# Patient Record
Sex: FEMALE | Race: ASIAN | Employment: OTHER | URBAN - NONMETROPOLITAN AREA
[De-identification: names, ages, dates, MRNs, and addresses within clinical notes are randomized per-mention and may not be internally consistent; named-entity substitution may affect disease eponyms.]

---

## 2017-01-01 ENCOUNTER — LAB ENCOUNTER (OUTPATIENT)
Dept: LAB | Age: 82
End: 2017-01-01
Attending: FAMILY MEDICINE
Payer: MEDICARE

## 2017-01-01 ENCOUNTER — TELEPHONE (OUTPATIENT)
Dept: FAMILY MEDICINE CLINIC | Facility: CLINIC | Age: 82
End: 2017-01-01

## 2017-01-01 ENCOUNTER — OFFICE VISIT (OUTPATIENT)
Dept: HEMATOLOGY/ONCOLOGY | Facility: HOSPITAL | Age: 82
End: 2017-01-01
Attending: INTERNAL MEDICINE
Payer: MEDICARE

## 2017-01-01 ENCOUNTER — ANESTHESIA EVENT (OUTPATIENT)
Dept: SURGERY | Facility: HOSPITAL | Age: 82
DRG: 330 | End: 2017-01-01
Payer: MEDICARE

## 2017-01-01 ENCOUNTER — OFFICE VISIT (OUTPATIENT)
Dept: FAMILY MEDICINE CLINIC | Facility: CLINIC | Age: 82
End: 2017-01-01

## 2017-01-01 ENCOUNTER — APPOINTMENT (OUTPATIENT)
Dept: ULTRASOUND IMAGING | Facility: HOSPITAL | Age: 82
DRG: 330 | End: 2017-01-01
Attending: HOSPITALIST
Payer: MEDICARE

## 2017-01-01 ENCOUNTER — TELEPHONE (OUTPATIENT)
Dept: HEMATOLOGY/ONCOLOGY | Facility: HOSPITAL | Age: 82
End: 2017-01-01

## 2017-01-01 ENCOUNTER — ANESTHESIA (OUTPATIENT)
Dept: SURGERY | Facility: HOSPITAL | Age: 82
DRG: 330 | End: 2017-01-01
Payer: MEDICARE

## 2017-01-01 ENCOUNTER — SNF VISIT (OUTPATIENT)
Dept: INTERNAL MEDICINE CLINIC | Facility: CLINIC | Age: 82
End: 2017-01-01

## 2017-01-01 ENCOUNTER — TELEPHONE (OUTPATIENT)
Dept: SURGERY | Facility: CLINIC | Age: 82
End: 2017-01-01

## 2017-01-01 ENCOUNTER — APPOINTMENT (OUTPATIENT)
Dept: ULTRASOUND IMAGING | Facility: HOSPITAL | Age: 82
DRG: 330 | End: 2017-01-01
Attending: PHYSICIAN ASSISTANT
Payer: MEDICARE

## 2017-01-01 ENCOUNTER — APPOINTMENT (OUTPATIENT)
Dept: GENERAL RADIOLOGY | Facility: HOSPITAL | Age: 82
DRG: 682 | End: 2017-01-01
Attending: INTERNAL MEDICINE
Payer: MEDICARE

## 2017-01-01 ENCOUNTER — APPOINTMENT (OUTPATIENT)
Dept: GENERAL RADIOLOGY | Facility: HOSPITAL | Age: 82
DRG: 330 | End: 2017-01-01
Attending: HOSPITALIST
Payer: MEDICARE

## 2017-01-01 ENCOUNTER — SURGERY (OUTPATIENT)
Age: 82
End: 2017-01-01

## 2017-01-01 ENCOUNTER — PATIENT MESSAGE (OUTPATIENT)
Dept: FAMILY MEDICINE CLINIC | Facility: CLINIC | Age: 82
End: 2017-01-01

## 2017-01-01 ENCOUNTER — PATIENT OUTREACH (OUTPATIENT)
Dept: CASE MANAGEMENT | Age: 82
End: 2017-01-01

## 2017-01-01 ENCOUNTER — OFFICE VISIT (OUTPATIENT)
Dept: SURGERY | Facility: CLINIC | Age: 82
End: 2017-01-01

## 2017-01-01 ENCOUNTER — HOSPITAL ENCOUNTER (OUTPATIENT)
Dept: NUCLEAR MEDICINE | Facility: HOSPITAL | Age: 82
Discharge: HOME | End: 2017-01-01
Attending: INTERNAL MEDICINE
Payer: MEDICARE

## 2017-01-01 ENCOUNTER — MED REC SCAN ONLY (OUTPATIENT)
Dept: FAMILY MEDICINE CLINIC | Facility: CLINIC | Age: 82
End: 2017-01-01

## 2017-01-01 ENCOUNTER — TELEPHONE (OUTPATIENT)
Dept: WOUND CARE | Facility: HOSPITAL | Age: 82
End: 2017-01-01

## 2017-01-01 ENCOUNTER — HOSPITAL ENCOUNTER (INPATIENT)
Facility: HOSPITAL | Age: 82
LOS: 4 days | Discharge: HOME HEALTH | DRG: 682 | End: 2017-01-01
Attending: EMERGENCY MEDICINE | Admitting: INTERNAL MEDICINE
Payer: MEDICARE

## 2017-01-01 ENCOUNTER — APPOINTMENT (OUTPATIENT)
Dept: CT IMAGING | Facility: HOSPITAL | Age: 82
DRG: 330 | End: 2017-01-01
Attending: INTERNAL MEDICINE
Payer: MEDICARE

## 2017-01-01 ENCOUNTER — APPOINTMENT (OUTPATIENT)
Dept: GENERAL RADIOLOGY | Facility: HOSPITAL | Age: 82
DRG: 682 | End: 2017-01-01
Attending: EMERGENCY MEDICINE
Payer: MEDICARE

## 2017-01-01 ENCOUNTER — APPOINTMENT (OUTPATIENT)
Dept: LAB | Age: 82
End: 2017-01-01
Attending: FAMILY MEDICINE
Payer: MEDICARE

## 2017-01-01 ENCOUNTER — SNF ADMIT/H&P (OUTPATIENT)
Dept: INTERNAL MEDICINE CLINIC | Facility: CLINIC | Age: 82
End: 2017-01-01

## 2017-01-01 ENCOUNTER — APPOINTMENT (OUTPATIENT)
Dept: ULTRASOUND IMAGING | Facility: HOSPITAL | Age: 82
DRG: 330 | End: 2017-01-01
Attending: UROLOGY
Payer: MEDICARE

## 2017-01-01 ENCOUNTER — HOSPITAL ENCOUNTER (INPATIENT)
Facility: HOSPITAL | Age: 82
LOS: 10 days | Discharge: NURSING, REHAB OR LONG-TERM CARE | DRG: 330 | End: 2017-01-01
Attending: INTERNAL MEDICINE | Admitting: INTERNAL MEDICINE
Payer: MEDICARE

## 2017-01-01 ENCOUNTER — HOSPITAL ENCOUNTER (OUTPATIENT)
Dept: CT IMAGING | Age: 82
Discharge: HOME | End: 2017-01-01
Attending: INTERNAL MEDICINE
Payer: MEDICARE

## 2017-01-01 VITALS
SYSTOLIC BLOOD PRESSURE: 122 MMHG | OXYGEN SATURATION: 98 % | HEART RATE: 104 BPM | TEMPERATURE: 99 F | WEIGHT: 85 LBS | BODY MASS INDEX: 16 KG/M2 | DIASTOLIC BLOOD PRESSURE: 70 MMHG

## 2017-01-01 VITALS
SYSTOLIC BLOOD PRESSURE: 99 MMHG | HEART RATE: 81 BPM | HEIGHT: 60 IN | WEIGHT: 78 LBS | RESPIRATION RATE: 18 BRPM | DIASTOLIC BLOOD PRESSURE: 59 MMHG | BODY MASS INDEX: 15.31 KG/M2

## 2017-01-01 VITALS
WEIGHT: 76.13 LBS | HEIGHT: 59 IN | BODY MASS INDEX: 15.35 KG/M2 | SYSTOLIC BLOOD PRESSURE: 136 MMHG | DIASTOLIC BLOOD PRESSURE: 72 MMHG | TEMPERATURE: 98 F

## 2017-01-01 VITALS
DIASTOLIC BLOOD PRESSURE: 60 MMHG | OXYGEN SATURATION: 99 % | RESPIRATION RATE: 14 BRPM | HEART RATE: 102 BPM | TEMPERATURE: 98 F | WEIGHT: 72.38 LBS | BODY MASS INDEX: 14 KG/M2 | SYSTOLIC BLOOD PRESSURE: 104 MMHG

## 2017-01-01 VITALS
BODY MASS INDEX: 18 KG/M2 | HEART RATE: 82 BPM | RESPIRATION RATE: 14 BRPM | WEIGHT: 100 LBS | TEMPERATURE: 96 F | SYSTOLIC BLOOD PRESSURE: 126 MMHG | DIASTOLIC BLOOD PRESSURE: 79 MMHG

## 2017-01-01 VITALS
SYSTOLIC BLOOD PRESSURE: 120 MMHG | SYSTOLIC BLOOD PRESSURE: 144 MMHG | RESPIRATION RATE: 12 BRPM | DIASTOLIC BLOOD PRESSURE: 82 MMHG | BODY MASS INDEX: 15 KG/M2 | WEIGHT: 85 LBS | HEART RATE: 76 BPM | HEART RATE: 84 BPM | WEIGHT: 83 LBS | OXYGEN SATURATION: 98 % | TEMPERATURE: 98 F | BODY MASS INDEX: 16 KG/M2 | TEMPERATURE: 98 F | DIASTOLIC BLOOD PRESSURE: 60 MMHG

## 2017-01-01 VITALS
TEMPERATURE: 98 F | HEART RATE: 90 BPM | DIASTOLIC BLOOD PRESSURE: 54 MMHG | SYSTOLIC BLOOD PRESSURE: 119 MMHG | HEIGHT: 59 IN | WEIGHT: 78.19 LBS | OXYGEN SATURATION: 99 % | RESPIRATION RATE: 18 BRPM | BODY MASS INDEX: 15.76 KG/M2

## 2017-01-01 VITALS
DIASTOLIC BLOOD PRESSURE: 68 MMHG | BODY MASS INDEX: 16 KG/M2 | SYSTOLIC BLOOD PRESSURE: 120 MMHG | WEIGHT: 80 LBS | TEMPERATURE: 98 F | HEART RATE: 86 BPM | RESPIRATION RATE: 16 BRPM | OXYGEN SATURATION: 99 %

## 2017-01-01 VITALS
OXYGEN SATURATION: 100 % | RESPIRATION RATE: 18 BRPM | SYSTOLIC BLOOD PRESSURE: 107 MMHG | HEIGHT: 59.02 IN | HEART RATE: 79 BPM | DIASTOLIC BLOOD PRESSURE: 64 MMHG | WEIGHT: 82.38 LBS | BODY MASS INDEX: 16.61 KG/M2 | TEMPERATURE: 98 F

## 2017-01-01 VITALS
OXYGEN SATURATION: 100 % | RESPIRATION RATE: 20 BRPM | HEART RATE: 72 BPM | DIASTOLIC BLOOD PRESSURE: 93 MMHG | BODY MASS INDEX: 15.2 KG/M2 | TEMPERATURE: 98 F | SYSTOLIC BLOOD PRESSURE: 119 MMHG | HEIGHT: 59 IN | WEIGHT: 75.38 LBS

## 2017-01-01 VITALS
BODY MASS INDEX: 18 KG/M2 | HEART RATE: 73 BPM | TEMPERATURE: 98 F | OXYGEN SATURATION: 97 % | RESPIRATION RATE: 18 BRPM | SYSTOLIC BLOOD PRESSURE: 139 MMHG | DIASTOLIC BLOOD PRESSURE: 57 MMHG | WEIGHT: 100 LBS

## 2017-01-01 VITALS
RESPIRATION RATE: 18 BRPM | HEIGHT: 59.02 IN | DIASTOLIC BLOOD PRESSURE: 71 MMHG | SYSTOLIC BLOOD PRESSURE: 115 MMHG | BODY MASS INDEX: 14.11 KG/M2 | TEMPERATURE: 98 F | WEIGHT: 70 LBS | HEART RATE: 90 BPM | OXYGEN SATURATION: 100 %

## 2017-01-01 DIAGNOSIS — C18.2 MALIGNANT NEOPLASM OF ASCENDING COLON (HCC): ICD-10-CM

## 2017-01-01 DIAGNOSIS — N17.9 AKI (ACUTE KIDNEY INJURY) (HCC): Primary | ICD-10-CM

## 2017-01-01 DIAGNOSIS — C18.2 MALIGNANT NEOPLASM OF ASCENDING COLON (HCC): Primary | ICD-10-CM

## 2017-01-01 DIAGNOSIS — R19.7 DIARRHEA, UNSPECIFIED TYPE: ICD-10-CM

## 2017-01-01 DIAGNOSIS — R63.4 WEIGHT LOSS: ICD-10-CM

## 2017-01-01 DIAGNOSIS — D50.9 MICROCYTIC HYPOCHROMIC ANEMIA: Primary | ICD-10-CM

## 2017-01-01 DIAGNOSIS — R23.1 PALLOR: ICD-10-CM

## 2017-01-01 DIAGNOSIS — D64.9 ANEMIA, UNSPECIFIED TYPE: ICD-10-CM

## 2017-01-01 DIAGNOSIS — R53.83 FATIGUE, UNSPECIFIED TYPE: ICD-10-CM

## 2017-01-01 DIAGNOSIS — E86.0 DEHYDRATION: ICD-10-CM

## 2017-01-01 DIAGNOSIS — R18.0 MALIGNANT ASCITES: ICD-10-CM

## 2017-01-01 DIAGNOSIS — E55.9 VITAMIN D DEFICIENCY: ICD-10-CM

## 2017-01-01 DIAGNOSIS — D64.89 ANEMIA DUE TO OTHER CAUSE: ICD-10-CM

## 2017-01-01 DIAGNOSIS — R59.0 LYMPHADENOPATHY, ABDOMINAL: ICD-10-CM

## 2017-01-01 DIAGNOSIS — D50.9 MICROCYTIC HYPOCHROMIC ANEMIA: ICD-10-CM

## 2017-01-01 DIAGNOSIS — R33.9 URINE RETENTION: ICD-10-CM

## 2017-01-01 DIAGNOSIS — L89.41: ICD-10-CM

## 2017-01-01 DIAGNOSIS — E46 PROTEIN CALORIE MALNUTRITION (HCC): ICD-10-CM

## 2017-01-01 DIAGNOSIS — R13.19 ESOPHAGEAL DYSPHAGIA: ICD-10-CM

## 2017-01-01 DIAGNOSIS — K56.609 SMALL BOWEL OBSTRUCTION (HCC): ICD-10-CM

## 2017-01-01 DIAGNOSIS — R19.00 PELVIC MASS IN FEMALE: ICD-10-CM

## 2017-01-01 DIAGNOSIS — K63.89 CECUM MASS: ICD-10-CM

## 2017-01-01 DIAGNOSIS — Z93.3 COLOSTOMY STATUS (HCC): ICD-10-CM

## 2017-01-01 DIAGNOSIS — R63.4 WEIGHT LOSS: Primary | ICD-10-CM

## 2017-01-01 DIAGNOSIS — Z93.3 S/P COLOSTOMY (HCC): Primary | ICD-10-CM

## 2017-01-01 DIAGNOSIS — C18.7 MALIGNANT NEOPLASM OF SIGMOID COLON (HCC): Primary | ICD-10-CM

## 2017-01-01 DIAGNOSIS — D64.89 ANEMIA DUE TO OTHER CAUSE: Primary | ICD-10-CM

## 2017-01-01 DIAGNOSIS — E03.9 HYPOTHYROIDISM, UNSPECIFIED TYPE: ICD-10-CM

## 2017-01-01 DIAGNOSIS — R10.84 GENERALIZED ABDOMINAL PAIN: ICD-10-CM

## 2017-01-01 DIAGNOSIS — E46 MALNUTRITION (HCC): ICD-10-CM

## 2017-01-01 DIAGNOSIS — E87.6 HYPOKALEMIA: ICD-10-CM

## 2017-01-01 DIAGNOSIS — R13.10 ODYNOPHAGIA: Primary | ICD-10-CM

## 2017-01-01 DIAGNOSIS — D50.0 IRON DEFICIENCY ANEMIA DUE TO CHRONIC BLOOD LOSS: ICD-10-CM

## 2017-01-01 DIAGNOSIS — Z51.89 ENCOUNTER FOR WOUND CARE: ICD-10-CM

## 2017-01-01 DIAGNOSIS — Z43.3 COLOSTOMY CARE (HCC): ICD-10-CM

## 2017-01-01 DIAGNOSIS — Z93.2 ILEOSTOMY IN PLACE (HCC): ICD-10-CM

## 2017-01-01 DIAGNOSIS — Z71.89 COUNSELING REGARDING END OF LIFE DECISION MAKING: Primary | ICD-10-CM

## 2017-01-01 DIAGNOSIS — R74.01 TRANSAMINITIS: ICD-10-CM

## 2017-01-01 DIAGNOSIS — R06.02 SHORTNESS OF BREATH: Primary | ICD-10-CM

## 2017-01-01 DIAGNOSIS — R11.0 NAUSEA: ICD-10-CM

## 2017-01-01 DIAGNOSIS — Z93.3 COLOSTOMY STATUS (HCC): Primary | ICD-10-CM

## 2017-01-01 DIAGNOSIS — C18.7 MALIGNANT NEOPLASM OF SIGMOID COLON (HCC): ICD-10-CM

## 2017-01-01 DIAGNOSIS — C77.9 REGIONAL LYMPH NODE METASTASIS PRESENT (HCC): ICD-10-CM

## 2017-01-01 DIAGNOSIS — Z71.89 COUNSELING REGARDING END OF LIFE DECISION MAKING: ICD-10-CM

## 2017-01-01 LAB
25-HYDROXYVITAMIN D (TOTAL): 69.4 NG/ML (ref 30–100)
ALBUMIN SERPL-MCNC: 1.3 G/DL (ref 3.5–4.8)
ALBUMIN SERPL-MCNC: 1.4 G/DL (ref 3.5–4.8)
ALBUMIN SERPL-MCNC: 1.7 G/DL (ref 3.5–4.8)
ALBUMIN SERPL-MCNC: 1.9 G/DL (ref 3.5–4.8)
ALBUMIN SERPL-MCNC: 2.1 G/DL (ref 3.5–4.8)
ALBUMIN SERPL-MCNC: 2.2 G/DL (ref 3.5–4.8)
ALBUMIN SERPL-MCNC: 2.2 G/DL (ref 3.5–4.8)
ALBUMIN SERPL-MCNC: 2.4 G/DL (ref 3.5–4.8)
ALBUMIN SERPL-MCNC: 2.5 G/DL (ref 3.5–4.8)
ALBUMIN SERPL-MCNC: 2.6 G/DL (ref 3.5–4.8)
ALBUMIN SERPL-MCNC: 2.6 G/DL (ref 3.5–4.8)
ALBUMIN SERPL-MCNC: 2.7 G/DL (ref 3.5–4.8)
ALBUMIN SERPL-MCNC: 3.1 G/DL (ref 3.5–4.8)
ALBUMIN SERPL-MCNC: 3.4 G/DL (ref 3.5–4.8)
ALP LIVER SERPL-CCNC: 100 U/L (ref 55–142)
ALP LIVER SERPL-CCNC: 168 U/L (ref 55–142)
ALP LIVER SERPL-CCNC: 181 U/L (ref 55–142)
ALP LIVER SERPL-CCNC: 183 U/L (ref 55–142)
ALP LIVER SERPL-CCNC: 213 U/L (ref 55–142)
ALP LIVER SERPL-CCNC: 221 U/L (ref 55–142)
ALP LIVER SERPL-CCNC: 249 U/L (ref 55–142)
ALP LIVER SERPL-CCNC: 269 U/L (ref 55–142)
ALP LIVER SERPL-CCNC: 40 U/L (ref 55–142)
ALP LIVER SERPL-CCNC: 41 U/L (ref 55–142)
ALP LIVER SERPL-CCNC: 45 U/L (ref 55–142)
ALP LIVER SERPL-CCNC: 50 U/L (ref 55–142)
ALP LIVER SERPL-CCNC: 52 U/L (ref 55–142)
ALP LIVER SERPL-CCNC: 57 U/L (ref 55–142)
ALP LIVER SERPL-CCNC: 64 U/L (ref 55–142)
ALP LIVER SERPL-CCNC: 84 U/L (ref 55–142)
ALP LIVER SERPL-CCNC: 88 U/L (ref 55–142)
ALT SERPL-CCNC: 10 U/L (ref 14–54)
ALT SERPL-CCNC: 10 U/L (ref 14–54)
ALT SERPL-CCNC: 109 U/L (ref 14–54)
ALT SERPL-CCNC: 12 U/L (ref 14–54)
ALT SERPL-CCNC: 128 U/L (ref 14–54)
ALT SERPL-CCNC: 13 U/L (ref 14–54)
ALT SERPL-CCNC: 14 U/L (ref 14–54)
ALT SERPL-CCNC: 16 U/L (ref 14–54)
ALT SERPL-CCNC: 65 U/L (ref 14–54)
ALT SERPL-CCNC: 67 U/L (ref 14–54)
ALT SERPL-CCNC: 70 U/L (ref 14–54)
ALT SERPL-CCNC: 8 U/L (ref 14–54)
ALT SERPL-CCNC: 8 U/L (ref 14–54)
ALT SERPL-CCNC: 81 U/L (ref 14–54)
ALT SERPL-CCNC: 84 U/L (ref 14–54)
ALT SERPL-CCNC: 9 U/L (ref 14–54)
ALT SERPL-CCNC: 9 U/L (ref 14–54)
ANTIBODY SCREEN: NEGATIVE
ANTIBODY SCREEN: NEGATIVE
AST SERPL-CCNC: 10 U/L (ref 15–41)
AST SERPL-CCNC: 10 U/L (ref 15–41)
AST SERPL-CCNC: 14 U/L (ref 15–41)
AST SERPL-CCNC: 20 U/L (ref 15–41)
AST SERPL-CCNC: 22 U/L (ref 15–41)
AST SERPL-CCNC: 26 U/L (ref 15–41)
AST SERPL-CCNC: 28 U/L (ref 15–41)
AST SERPL-CCNC: 31 U/L (ref 15–41)
AST SERPL-CCNC: 32 U/L (ref 15–41)
AST SERPL-CCNC: 38 U/L (ref 15–41)
AST SERPL-CCNC: 4 U/L (ref 15–41)
AST SERPL-CCNC: 47 U/L (ref 15–41)
AST SERPL-CCNC: 5 U/L (ref 15–41)
AST SERPL-CCNC: 54 U/L (ref 15–41)
AST SERPL-CCNC: 6 U/L (ref 15–41)
AST SERPL-CCNC: 6 U/L (ref 15–41)
AST SERPL-CCNC: 7 U/L (ref 15–41)
ATRIAL RATE: 75 BPM
ATRIAL RATE: 80 BPM
BASOPHILS # BLD AUTO: 0.01 X10(3) UL (ref 0–0.1)
BASOPHILS # BLD AUTO: 0.02 X10(3) UL (ref 0–0.1)
BASOPHILS # BLD AUTO: 0.04 X10(3) UL (ref 0–0.1)
BASOPHILS # BLD AUTO: 0.05 X10(3) UL (ref 0–0.1)
BASOPHILS NFR BLD AUTO: 0.1 %
BASOPHILS NFR BLD AUTO: 0.1 %
BASOPHILS NFR BLD AUTO: 0.2 %
BASOPHILS NFR BLD AUTO: 0.3 %
BASOPHILS NFR BLD AUTO: 0.3 %
BASOPHILS NFR BLD AUTO: 0.4 %
BASOPHILS NFR BLD AUTO: 0.5 %
BASOPHILS NFR BLD AUTO: 0.6 %
BASOPHILS NFR BLD AUTO: 0.7 %
BASOPHILS NFR BLD AUTO: 0.8 %
BILIRUB SERPL-MCNC: 0.1 MG/DL (ref 0.1–2)
BILIRUB SERPL-MCNC: 0.2 MG/DL (ref 0.1–2)
BILIRUB SERPL-MCNC: 0.3 MG/DL (ref 0.1–2)
BILIRUB SERPL-MCNC: 0.4 MG/DL (ref 0.1–2)
BILIRUB UR QL STRIP.AUTO: NEGATIVE
BLOOD TYPE BARCODE: 5100
BUN BLD-MCNC: 14 MG/DL (ref 8–20)
BUN BLD-MCNC: 16 MG/DL (ref 8–20)
BUN BLD-MCNC: 19 MG/DL (ref 8–20)
BUN BLD-MCNC: 20 MG/DL (ref 8–20)
BUN BLD-MCNC: 23 MG/DL (ref 8–20)
BUN BLD-MCNC: 24 MG/DL (ref 8–20)
BUN BLD-MCNC: 29 MG/DL (ref 8–20)
BUN BLD-MCNC: 30 MG/DL (ref 8–20)
BUN BLD-MCNC: 31 MG/DL (ref 8–20)
BUN BLD-MCNC: 33 MG/DL (ref 8–20)
BUN BLD-MCNC: 36 MG/DL (ref 8–20)
BUN BLD-MCNC: 37 MG/DL (ref 8–20)
BUN BLD-MCNC: 38 MG/DL (ref 8–20)
BUN BLD-MCNC: 39 MG/DL (ref 8–20)
BUN BLD-MCNC: 40 MG/DL (ref 8–20)
BUN BLD-MCNC: 43 MG/DL (ref 8–20)
BUN BLD-MCNC: 54 MG/DL (ref 8–20)
BUN BLD-MCNC: 67 MG/DL (ref 8–20)
BUN BLD-MCNC: 69 MG/DL (ref 8–20)
CALCIUM BLD-MCNC: 10.2 MG/DL (ref 8.3–10.3)
CALCIUM BLD-MCNC: 6.3 MG/DL (ref 8.3–10.3)
CALCIUM BLD-MCNC: 6.8 MG/DL (ref 8.3–10.3)
CALCIUM BLD-MCNC: 6.8 MG/DL (ref 8.3–10.3)
CALCIUM BLD-MCNC: 7 MG/DL (ref 8.3–10.3)
CALCIUM BLD-MCNC: 7.5 MG/DL (ref 8.3–10.3)
CALCIUM BLD-MCNC: 7.6 MG/DL (ref 8.3–10.3)
CALCIUM BLD-MCNC: 7.7 MG/DL (ref 8.3–10.3)
CALCIUM BLD-MCNC: 7.8 MG/DL (ref 8.3–10.3)
CALCIUM BLD-MCNC: 7.9 MG/DL (ref 8.3–10.3)
CALCIUM BLD-MCNC: 8 MG/DL (ref 8.3–10.3)
CALCIUM BLD-MCNC: 8.4 MG/DL (ref 8.3–10.3)
CALCIUM BLD-MCNC: 8.5 MG/DL (ref 8.3–10.3)
CALCIUM BLD-MCNC: 8.6 MG/DL (ref 8.3–10.3)
CALCIUM BLD-MCNC: 8.7 MG/DL (ref 8.3–10.3)
CALCIUM BLD-MCNC: 8.8 MG/DL (ref 8.3–10.3)
CALCIUM BLD-MCNC: 8.8 MG/DL (ref 8.3–10.3)
CALCIUM BLD-MCNC: 9.7 MG/DL (ref 8.3–10.3)
CALCIUM BLD-MCNC: 9.9 MG/DL (ref 8.3–10.3)
CANCER AG 125 (CA125): 11.8 U/ML (ref ?–35)
CEA: 15.9 NG/ML (ref 0.5–5)
CEA: 5 NG/ML (ref 0.5–5)
CHLORIDE: 104 MMOL/L (ref 101–111)
CHLORIDE: 105 MMOL/L (ref 101–111)
CHLORIDE: 106 MMOL/L (ref 101–111)
CHLORIDE: 107 MMOL/L (ref 101–111)
CHLORIDE: 108 MMOL/L (ref 101–111)
CHLORIDE: 108 MMOL/L (ref 101–111)
CHLORIDE: 110 MMOL/L (ref 101–111)
CHLORIDE: 111 MMOL/L (ref 101–111)
CHLORIDE: 115 MMOL/L (ref 101–111)
CHLORIDE: 115 MMOL/L (ref 101–111)
CHLORIDE: 118 MMOL/L (ref 101–111)
CHLORIDE: 119 MMOL/L (ref 101–111)
CHLORIDE: 119 MMOL/L (ref 101–111)
CHLORIDE: 89 MMOL/L (ref 101–111)
CHLORIDE: 99 MMOL/L (ref 101–111)
CO2: 17 MMOL/L (ref 22–32)
CO2: 17 MMOL/L (ref 22–32)
CO2: 19 MMOL/L (ref 22–32)
CO2: 19 MMOL/L (ref 22–32)
CO2: 20 MMOL/L (ref 22–32)
CO2: 23 MMOL/L (ref 22–32)
CO2: 24 MMOL/L (ref 22–32)
CO2: 25 MMOL/L (ref 22–32)
CO2: 26 MMOL/L (ref 22–32)
CO2: 27 MMOL/L (ref 22–32)
CO2: 29 MMOL/L (ref 22–32)
CO2: 32 MMOL/L (ref 22–32)
CO2: 35 MMOL/L (ref 22–32)
CO2: 38 MMOL/L (ref 22–32)
COLOR UR AUTO: YELLOW
CREAT BLD-MCNC: 0.4 MG/DL (ref 0.55–1.02)
CREAT BLD-MCNC: 0.5 MG/DL (ref 0.55–1.02)
CREAT BLD-MCNC: 0.58 MG/DL (ref 0.55–1.02)
CREAT BLD-MCNC: 0.73 MG/DL (ref 0.55–1.02)
CREAT BLD-MCNC: 0.8 MG/DL (ref 0.55–1.02)
CREAT BLD-MCNC: 0.84 MG/DL (ref 0.55–1.02)
CREAT BLD-MCNC: 0.86 MG/DL (ref 0.55–1.02)
CREAT BLD-MCNC: 0.92 MG/DL (ref 0.55–1.02)
CREAT BLD-MCNC: 0.94 MG/DL (ref 0.55–1.02)
CREAT BLD-MCNC: 1 MG/DL (ref 0.55–1.02)
CREAT BLD-MCNC: 1 MG/DL (ref 0.55–1.02)
CREAT BLD-MCNC: 1.02 MG/DL (ref 0.55–1.02)
CREAT BLD-MCNC: 1.08 MG/DL (ref 0.55–1.02)
CREAT BLD-MCNC: 1.09 MG/DL (ref 0.55–1.02)
CREAT BLD-MCNC: 1.13 MG/DL (ref 0.55–1.02)
CREAT BLD-MCNC: 1.21 MG/DL (ref 0.55–1.02)
CREAT BLD-MCNC: 1.21 MG/DL (ref 0.55–1.02)
CREAT BLD-MCNC: 1.3 MG/DL (ref 0.55–1.02)
CREAT BLD-MCNC: 1.4 MG/DL (ref 0.55–1.02)
DEPRECATED HBV CORE AB SER IA-ACNC: 15.6 NG/ML (ref 10–291)
EOSINOPHIL # BLD AUTO: 0 X10(3) UL (ref 0–0.3)
EOSINOPHIL # BLD AUTO: 0.01 X10(3) UL (ref 0–0.3)
EOSINOPHIL # BLD AUTO: 0.03 X10(3) UL (ref 0–0.3)
EOSINOPHIL # BLD AUTO: 0.04 X10(3) UL (ref 0–0.3)
EOSINOPHIL # BLD AUTO: 0.05 X10(3) UL (ref 0–0.3)
EOSINOPHIL # BLD AUTO: 0.08 X10(3) UL (ref 0–0.3)
EOSINOPHIL # BLD AUTO: 0.09 X10(3) UL (ref 0–0.3)
EOSINOPHIL # BLD AUTO: 0.13 X10(3) UL (ref 0–0.3)
EOSINOPHIL # BLD AUTO: 0.14 X10(3) UL (ref 0–0.3)
EOSINOPHIL # BLD AUTO: 0.17 X10(3) UL (ref 0–0.3)
EOSINOPHIL NFR BLD AUTO: 0 %
EOSINOPHIL NFR BLD AUTO: 0.1 %
EOSINOPHIL NFR BLD AUTO: 0.5 %
EOSINOPHIL NFR BLD AUTO: 0.7 %
EOSINOPHIL NFR BLD AUTO: 0.9 %
EOSINOPHIL NFR BLD AUTO: 1.2 %
EOSINOPHIL NFR BLD AUTO: 1.2 %
EOSINOPHIL NFR BLD AUTO: 2.1 %
EOSINOPHIL NFR BLD AUTO: 2.1 %
EOSINOPHIL NFR BLD AUTO: 3.1 %
ERYTHROCYTE [DISTWIDTH] IN BLOOD BY AUTOMATED COUNT: 20.3 % (ref 11.5–16)
ERYTHROCYTE [DISTWIDTH] IN BLOOD BY AUTOMATED COUNT: 20.3 % (ref 11.5–16)
ERYTHROCYTE [DISTWIDTH] IN BLOOD BY AUTOMATED COUNT: 20.4 % (ref 11.5–16)
ERYTHROCYTE [DISTWIDTH] IN BLOOD BY AUTOMATED COUNT: 21.5 % (ref 11.5–16)
ERYTHROCYTE [DISTWIDTH] IN BLOOD BY AUTOMATED COUNT: 22.1 % (ref 11.5–16)
ERYTHROCYTE [DISTWIDTH] IN BLOOD BY AUTOMATED COUNT: 23.7 % (ref 11.5–16)
ERYTHROCYTE [DISTWIDTH] IN BLOOD BY AUTOMATED COUNT: 24 % (ref 11.5–16)
ERYTHROCYTE [DISTWIDTH] IN BLOOD BY AUTOMATED COUNT: 24.2 % (ref 11.5–16)
ERYTHROCYTE [DISTWIDTH] IN BLOOD BY AUTOMATED COUNT: 25.2 % (ref 11.5–16)
ERYTHROCYTE [DISTWIDTH] IN BLOOD BY AUTOMATED COUNT: 27.9 % (ref 11.5–16)
ERYTHROCYTE [DISTWIDTH] IN BLOOD BY AUTOMATED COUNT: 29.5 % (ref 11.5–16)
ERYTHROCYTE [DISTWIDTH] IN BLOOD BY AUTOMATED COUNT: 30.6 % (ref 11.5–16)
ERYTHROCYTE [DISTWIDTH] IN BLOOD BY AUTOMATED COUNT: 30.9 % (ref 11.5–16)
ERYTHROPOIETIN (EPO): 42 MU/ML
FOLATE (FOLIC ACID), SERUM: 77.2 NG/ML (ref 8.7–24)
FREE T4: 1.2 NG/DL (ref 0.9–1.8)
FREE T4: 1.2 NG/DL (ref 0.9–1.8)
GLUCOSE BLD-MCNC: 100 MG/DL (ref 70–99)
GLUCOSE BLD-MCNC: 102 MG/DL (ref 65–99)
GLUCOSE BLD-MCNC: 103 MG/DL (ref 65–99)
GLUCOSE BLD-MCNC: 104 MG/DL (ref 65–99)
GLUCOSE BLD-MCNC: 105 MG/DL (ref 65–99)
GLUCOSE BLD-MCNC: 106 MG/DL (ref 65–99)
GLUCOSE BLD-MCNC: 106 MG/DL (ref 65–99)
GLUCOSE BLD-MCNC: 106 MG/DL (ref 70–99)
GLUCOSE BLD-MCNC: 107 MG/DL (ref 70–99)
GLUCOSE BLD-MCNC: 110 MG/DL (ref 65–99)
GLUCOSE BLD-MCNC: 113 MG/DL (ref 65–99)
GLUCOSE BLD-MCNC: 114 MG/DL (ref 65–99)
GLUCOSE BLD-MCNC: 114 MG/DL (ref 70–99)
GLUCOSE BLD-MCNC: 116 MG/DL (ref 65–99)
GLUCOSE BLD-MCNC: 118 MG/DL (ref 65–99)
GLUCOSE BLD-MCNC: 120 MG/DL (ref 70–99)
GLUCOSE BLD-MCNC: 121 MG/DL (ref 65–99)
GLUCOSE BLD-MCNC: 121 MG/DL (ref 65–99)
GLUCOSE BLD-MCNC: 122 MG/DL (ref 65–99)
GLUCOSE BLD-MCNC: 122 MG/DL (ref 65–99)
GLUCOSE BLD-MCNC: 123 MG/DL (ref 65–99)
GLUCOSE BLD-MCNC: 123 MG/DL (ref 65–99)
GLUCOSE BLD-MCNC: 124 MG/DL (ref 70–99)
GLUCOSE BLD-MCNC: 125 MG/DL (ref 65–99)
GLUCOSE BLD-MCNC: 125 MG/DL (ref 65–99)
GLUCOSE BLD-MCNC: 126 MG/DL (ref 65–99)
GLUCOSE BLD-MCNC: 128 MG/DL (ref 65–99)
GLUCOSE BLD-MCNC: 128 MG/DL (ref 65–99)
GLUCOSE BLD-MCNC: 130 MG/DL (ref 65–99)
GLUCOSE BLD-MCNC: 130 MG/DL (ref 65–99)
GLUCOSE BLD-MCNC: 131 MG/DL (ref 65–99)
GLUCOSE BLD-MCNC: 134 MG/DL (ref 65–99)
GLUCOSE BLD-MCNC: 135 MG/DL (ref 70–99)
GLUCOSE BLD-MCNC: 137 MG/DL (ref 65–99)
GLUCOSE BLD-MCNC: 137 MG/DL (ref 65–99)
GLUCOSE BLD-MCNC: 138 MG/DL (ref 65–99)
GLUCOSE BLD-MCNC: 139 MG/DL (ref 65–99)
GLUCOSE BLD-MCNC: 141 MG/DL (ref 65–99)
GLUCOSE BLD-MCNC: 143 MG/DL (ref 65–99)
GLUCOSE BLD-MCNC: 144 MG/DL (ref 65–99)
GLUCOSE BLD-MCNC: 147 MG/DL (ref 65–99)
GLUCOSE BLD-MCNC: 147 MG/DL (ref 70–99)
GLUCOSE BLD-MCNC: 148 MG/DL (ref 70–99)
GLUCOSE BLD-MCNC: 151 MG/DL (ref 65–99)
GLUCOSE BLD-MCNC: 152 MG/DL (ref 65–99)
GLUCOSE BLD-MCNC: 152 MG/DL (ref 65–99)
GLUCOSE BLD-MCNC: 153 MG/DL (ref 65–99)
GLUCOSE BLD-MCNC: 158 MG/DL (ref 65–99)
GLUCOSE BLD-MCNC: 159 MG/DL (ref 70–99)
GLUCOSE BLD-MCNC: 164 MG/DL (ref 65–99)
GLUCOSE BLD-MCNC: 165 MG/DL (ref 65–99)
GLUCOSE BLD-MCNC: 165 MG/DL (ref 65–99)
GLUCOSE BLD-MCNC: 181 MG/DL (ref 65–99)
GLUCOSE BLD-MCNC: 197 MG/DL (ref 65–99)
GLUCOSE BLD-MCNC: 202 MG/DL (ref 65–99)
GLUCOSE BLD-MCNC: 220 MG/DL (ref 65–99)
GLUCOSE BLD-MCNC: 223 MG/DL (ref 65–99)
GLUCOSE BLD-MCNC: 224 MG/DL (ref 65–99)
GLUCOSE BLD-MCNC: 238 MG/DL (ref 65–99)
GLUCOSE BLD-MCNC: 257 MG/DL (ref 70–99)
GLUCOSE BLD-MCNC: 261 MG/DL (ref 65–99)
GLUCOSE BLD-MCNC: 57 MG/DL (ref 65–99)
GLUCOSE BLD-MCNC: 61 MG/DL (ref 70–99)
GLUCOSE BLD-MCNC: 65 MG/DL (ref 65–99)
GLUCOSE BLD-MCNC: 70 MG/DL (ref 70–99)
GLUCOSE BLD-MCNC: 85 MG/DL (ref 70–99)
GLUCOSE BLD-MCNC: 87 MG/DL (ref 65–99)
GLUCOSE BLD-MCNC: 92 MG/DL (ref 70–99)
GLUCOSE BLD-MCNC: 94 MG/DL (ref 65–99)
GLUCOSE BLD-MCNC: 94 MG/DL (ref 65–99)
GLUCOSE BLD-MCNC: 94 MG/DL (ref 70–99)
GLUCOSE BLD-MCNC: 97 MG/DL (ref 70–99)
GLUCOSE BLD-MCNC: 97 MG/DL (ref 70–99)
GLUCOSE BLD-MCNC: 98 MG/DL (ref 70–99)
GLUCOSE UR STRIP.AUTO-MCNC: NEGATIVE MG/DL
HAV AB SERPL IA-ACNC: 790 PG/ML (ref 193–986)
HAV IGM SER QL: 1.7 MG/DL (ref 1.7–3)
HAV IGM SER QL: 1.9 MG/DL (ref 1.7–3)
HAV IGM SER QL: 1.9 MG/DL (ref 1.7–3)
HAV IGM SER QL: 2 MG/DL (ref 1.7–3)
HAV IGM SER QL: 2.1 MG/DL (ref 1.7–3)
HAV IGM SER QL: 2.1 MG/DL (ref 1.7–3)
HAV IGM SER QL: 2.2 MG/DL (ref 1.7–3)
HAV IGM SER QL: 2.3 MG/DL (ref 1.7–3)
HAV IGM SER QL: 2.5 MG/DL (ref 1.7–3)
HAV IGM SER QL: 2.7 MG/DL (ref 1.7–3)
HAV IGM SER QL: 2.7 MG/DL (ref 1.7–3)
HAV IGM SER QL: 2.9 MG/DL (ref 1.7–3)
HCT VFR BLD AUTO: 20.1 % (ref 34–50)
HCT VFR BLD AUTO: 20.8 % (ref 34–50)
HCT VFR BLD AUTO: 22.9 % (ref 34–50)
HCT VFR BLD AUTO: 24.6 % (ref 34–50)
HCT VFR BLD AUTO: 24.8 % (ref 34–50)
HCT VFR BLD AUTO: 25 % (ref 34–50)
HCT VFR BLD AUTO: 25 % (ref 34–50)
HCT VFR BLD AUTO: 26 % (ref 34–50)
HCT VFR BLD AUTO: 26.9 % (ref 34–50)
HCT VFR BLD AUTO: 27.5 % (ref 34–50)
HCT VFR BLD AUTO: 27.7 % (ref 34–50)
HCT VFR BLD AUTO: 29.4 % (ref 34–50)
HCT VFR BLD AUTO: 32.2 % (ref 34–50)
HGB BLD-MCNC: 11 G/DL (ref 12–16)
HGB BLD-MCNC: 5.3 G/DL (ref 12–16)
HGB BLD-MCNC: 6.1 G/DL (ref 12–16)
HGB BLD-MCNC: 6.5 G/DL (ref 12–16)
HGB BLD-MCNC: 7.1 G/DL (ref 12–16)
HGB BLD-MCNC: 7.1 G/DL (ref 12–16)
HGB BLD-MCNC: 7.4 G/DL (ref 12–16)
HGB BLD-MCNC: 7.6 G/DL (ref 12–16)
HGB BLD-MCNC: 7.9 G/DL (ref 12–16)
HGB BLD-MCNC: 8.1 G/DL (ref 12–16)
HGB BLD-MCNC: 8.8 G/DL (ref 12–16)
HGB BLD-MCNC: 8.9 G/DL (ref 12–16)
HGB BLD-MCNC: 8.9 G/DL (ref 12–16)
HGB BLD-MCNC: 9.1 G/DL (ref 12–16)
HGB BLD-MCNC: 9.5 G/DL (ref 12–16)
IMMATURE GRANULOCYTE COUNT: 0.02 X10(3) UL (ref 0–1)
IMMATURE GRANULOCYTE COUNT: 0.03 X10(3) UL (ref 0–1)
IMMATURE GRANULOCYTE COUNT: 0.03 X10(3) UL (ref 0–1)
IMMATURE GRANULOCYTE COUNT: 0.06 X10(3) UL (ref 0–1)
IMMATURE GRANULOCYTE COUNT: 0.07 X10(3) UL (ref 0–1)
IMMATURE GRANULOCYTE RATIO %: 0.3 %
IMMATURE GRANULOCYTE RATIO %: 0.4 %
IMMATURE GRANULOCYTE RATIO %: 0.5 %
INR BLD: 0.92 (ref 0.89–1.11)
INR BLD: 1.08 (ref 0.89–1.12)
IRON SATURATION: 4 % (ref 13–45)
IRON: 16 UG/DL (ref 28–170)
KETONES UR STRIP.AUTO-MCNC: NEGATIVE MG/DL
LEUKOCYTE ESTERASE UR QL STRIP.AUTO: NEGATIVE
LYMPHOCYTES # BLD AUTO: 0.48 X10(3) UL (ref 0.9–4)
LYMPHOCYTES # BLD AUTO: 0.51 X10(3) UL (ref 0.9–4)
LYMPHOCYTES # BLD AUTO: 0.66 X10(3) UL (ref 0.9–4)
LYMPHOCYTES # BLD AUTO: 0.7 X10(3) UL (ref 0.9–4)
LYMPHOCYTES # BLD AUTO: 0.77 X10(3) UL (ref 0.9–4)
LYMPHOCYTES # BLD AUTO: 0.86 X10(3) UL (ref 0.9–4)
LYMPHOCYTES # BLD AUTO: 0.93 X10(3) UL (ref 0.9–4)
LYMPHOCYTES # BLD AUTO: 1.07 X10(3) UL (ref 0.9–4)
LYMPHOCYTES # BLD AUTO: 1.09 X10(3) UL (ref 0.9–4)
LYMPHOCYTES # BLD AUTO: 1.22 X10(3) UL (ref 0.9–4)
LYMPHOCYTES NFR BLD AUTO: 14 %
LYMPHOCYTES NFR BLD AUTO: 14.4 %
LYMPHOCYTES NFR BLD AUTO: 14.7 %
LYMPHOCYTES NFR BLD AUTO: 15.4 %
LYMPHOCYTES NFR BLD AUTO: 15.9 %
LYMPHOCYTES NFR BLD AUTO: 16.7 %
LYMPHOCYTES NFR BLD AUTO: 18.6 %
LYMPHOCYTES NFR BLD AUTO: 3.5 %
LYMPHOCYTES NFR BLD AUTO: 5.2 %
LYMPHOCYTES NFR BLD AUTO: 7.4 %
M PROTEIN MFR SERPL ELPH: 3.8 G/DL (ref 6.1–8.3)
M PROTEIN MFR SERPL ELPH: 4.1 G/DL (ref 6.1–8.3)
M PROTEIN MFR SERPL ELPH: 4.3 G/DL (ref 6.1–8.3)
M PROTEIN MFR SERPL ELPH: 4.4 G/DL (ref 6.1–8.3)
M PROTEIN MFR SERPL ELPH: 4.5 G/DL (ref 6.1–8.3)
M PROTEIN MFR SERPL ELPH: 4.6 G/DL (ref 6.1–8.3)
M PROTEIN MFR SERPL ELPH: 4.9 G/DL (ref 6.1–8.3)
M PROTEIN MFR SERPL ELPH: 5.7 G/DL (ref 6.1–8.3)
M PROTEIN MFR SERPL ELPH: 5.7 G/DL (ref 6.1–8.3)
M PROTEIN MFR SERPL ELPH: 5.8 G/DL (ref 6.1–8.3)
M PROTEIN MFR SERPL ELPH: 5.9 G/DL (ref 6.1–8.3)
M PROTEIN MFR SERPL ELPH: 5.9 G/DL (ref 6.1–8.3)
M PROTEIN MFR SERPL ELPH: 6 G/DL (ref 6.1–8.3)
M PROTEIN MFR SERPL ELPH: 6.6 G/DL (ref 6.1–8.3)
M PROTEIN MFR SERPL ELPH: 7.1 G/DL (ref 6.1–8.3)
M PROTEIN MFR SERPL ELPH: 7.2 G/DL (ref 6.1–8.3)
M PROTEIN MFR SERPL ELPH: 7.6 G/DL (ref 6.1–8.3)
MCH RBC QN AUTO: 17.1 PG (ref 27–33.2)
MCH RBC QN AUTO: 21.1 PG (ref 27–33.2)
MCH RBC QN AUTO: 22.6 PG (ref 27–33.2)
MCH RBC QN AUTO: 22.7 PG (ref 27–33.2)
MCH RBC QN AUTO: 23.2 PG (ref 27–33.2)
MCH RBC QN AUTO: 25.5 PG (ref 27–33.2)
MCH RBC QN AUTO: 25.6 PG (ref 27–33.2)
MCH RBC QN AUTO: 25.7 PG (ref 27–33.2)
MCH RBC QN AUTO: 27.1 PG (ref 27–33.2)
MCH RBC QN AUTO: 27.2 PG (ref 27–33.2)
MCH RBC QN AUTO: 27.8 PG (ref 27–33.2)
MCH RBC QN AUTO: 27.8 PG (ref 27–33.2)
MCH RBC QN AUTO: 28.7 PG (ref 27–33.2)
MCHC RBC AUTO-ENTMCNC: 25.5 G/DL (ref 31–37)
MCHC RBC AUTO-ENTMCNC: 27.6 G/DL (ref 31–37)
MCHC RBC AUTO-ENTMCNC: 28.4 G/DL (ref 31–37)
MCHC RBC AUTO-ENTMCNC: 29.5 G/DL (ref 31–37)
MCHC RBC AUTO-ENTMCNC: 30.1 G/DL (ref 31–37)
MCHC RBC AUTO-ENTMCNC: 30.3 G/DL (ref 31–37)
MCHC RBC AUTO-ENTMCNC: 30.4 G/DL (ref 31–37)
MCHC RBC AUTO-ENTMCNC: 30.6 G/DL (ref 31–37)
MCHC RBC AUTO-ENTMCNC: 32.1 G/DL (ref 31–37)
MCHC RBC AUTO-ENTMCNC: 32.4 G/DL (ref 31–37)
MCHC RBC AUTO-ENTMCNC: 32.7 G/DL (ref 31–37)
MCV RBC AUTO: 67.1 FL (ref 81–100)
MCV RBC AUTO: 76.6 FL (ref 81–100)
MCV RBC AUTO: 79.6 FL (ref 81–100)
MCV RBC AUTO: 79.9 FL (ref 81–100)
MCV RBC AUTO: 81.8 FL (ref 81–100)
MCV RBC AUTO: 83.6 FL (ref 81–100)
MCV RBC AUTO: 84.5 FL (ref 81–100)
MCV RBC AUTO: 84.8 FL (ref 81–100)
MCV RBC AUTO: 86.6 FL (ref 81–100)
MCV RBC AUTO: 87 FL (ref 81–100)
MCV RBC AUTO: 87.6 FL (ref 81–100)
MCV RBC AUTO: 89.7 FL (ref 81–100)
MCV RBC AUTO: 90.8 FL (ref 81–100)
MISMATCH REPAIR BY IHC RESULT: NORMAL
MISMATCH REPAIR BY IHC W/MLH1: NORMAL
MISMATCH REPAIR BY IHC W/MSH2: NORMAL
MISMATCH REPAIR BY IHC W/MSH6: NORMAL
MISMATCH REPAIR BY IHC W/PMS2: NORMAL
MONOCYTES # BLD AUTO: 0.34 X10(3) UL (ref 0.1–0.6)
MONOCYTES # BLD AUTO: 0.41 X10(3) UL (ref 0.1–0.6)
MONOCYTES # BLD AUTO: 0.44 X10(3) UL (ref 0.1–0.6)
MONOCYTES # BLD AUTO: 0.45 X10(3) UL (ref 0.1–0.6)
MONOCYTES # BLD AUTO: 0.5 X10(3) UL (ref 0.1–0.6)
MONOCYTES # BLD AUTO: 0.52 X10(3) UL (ref 0.1–0.6)
MONOCYTES # BLD AUTO: 0.53 X10(3) UL (ref 0.1–0.6)
MONOCYTES # BLD AUTO: 0.53 X10(3) UL (ref 0.1–0.6)
MONOCYTES # BLD AUTO: 0.6 X10(3) UL (ref 0.1–0.6)
MONOCYTES # BLD AUTO: 0.75 X10(3) UL (ref 0.1–0.6)
MONOCYTES NFR BLD AUTO: 12.6 %
MONOCYTES NFR BLD AUTO: 4.1 %
MONOCYTES NFR BLD AUTO: 4.6 %
MONOCYTES NFR BLD AUTO: 5.9 %
MONOCYTES NFR BLD AUTO: 6.1 %
MONOCYTES NFR BLD AUTO: 6.3 %
MONOCYTES NFR BLD AUTO: 7.9 %
MONOCYTES NFR BLD AUTO: 8.1 %
MONOCYTES NFR BLD AUTO: 8.8 %
MONOCYTES NFR BLD AUTO: 9.1 %
NEUTROPHIL ABS PRELIM: 11.26 X10 (3) UL (ref 1.3–6.7)
NEUTROPHIL ABS PRELIM: 13.55 X10 (3) UL (ref 1.3–6.7)
NEUTROPHIL ABS PRELIM: 2.9 X10 (3) UL (ref 1.3–6.7)
NEUTROPHIL ABS PRELIM: 3.88 X10 (3) UL (ref 1.3–6.7)
NEUTROPHIL ABS PRELIM: 4.14 X10 (3) UL (ref 1.3–6.7)
NEUTROPHIL ABS PRELIM: 4.39 X10 (3) UL (ref 1.3–6.7)
NEUTROPHIL ABS PRELIM: 4.62 X10 (3) UL (ref 1.3–6.7)
NEUTROPHIL ABS PRELIM: 5.51 X10 (3) UL (ref 1.3–6.7)
NEUTROPHIL ABS PRELIM: 5.77 X10 (3) UL (ref 1.3–6.7)
NEUTROPHIL ABS PRELIM: 5.85 X10 (3) UL (ref 1.3–6.7)
NEUTROPHILS # BLD AUTO: 11.26 X10(3) UL (ref 1.3–6.7)
NEUTROPHILS # BLD AUTO: 13.55 X10(3) UL (ref 1.3–6.7)
NEUTROPHILS # BLD AUTO: 2.9 X10(3) UL (ref 1.3–6.7)
NEUTROPHILS # BLD AUTO: 3.88 X10(3) UL (ref 1.3–6.7)
NEUTROPHILS # BLD AUTO: 4.14 X10(3) UL (ref 1.3–6.7)
NEUTROPHILS # BLD AUTO: 4.39 X10(3) UL (ref 1.3–6.7)
NEUTROPHILS # BLD AUTO: 4.62 X10(3) UL (ref 1.3–6.7)
NEUTROPHILS # BLD AUTO: 5.51 X10(3) UL (ref 1.3–6.7)
NEUTROPHILS # BLD AUTO: 5.77 X10(3) UL (ref 1.3–6.7)
NEUTROPHILS # BLD AUTO: 5.85 X10(3) UL (ref 1.3–6.7)
NEUTROPHILS NFR BLD AUTO: 69.3 %
NEUTROPHILS NFR BLD AUTO: 70.5 %
NEUTROPHILS NFR BLD AUTO: 71.8 %
NEUTROPHILS NFR BLD AUTO: 72.6 %
NEUTROPHILS NFR BLD AUTO: 75.2 %
NEUTROPHILS NFR BLD AUTO: 78.1 %
NEUTROPHILS NFR BLD AUTO: 78.9 %
NEUTROPHILS NFR BLD AUTO: 84.3 %
NEUTROPHILS NFR BLD AUTO: 88.2 %
NEUTROPHILS NFR BLD AUTO: 91.8 %
NITRITE UR QL STRIP.AUTO: NEGATIVE
NON GYNE INTERPRETATION: NEGATIVE
P AXIS: 42 DEGREES
P AXIS: 85 DEGREES
P-R INTERVAL: 192 MS
P-R INTERVAL: 222 MS
PH UR STRIP.AUTO: 5 [PH] (ref 4.5–8)
PHOSPHATE SERPL-MCNC: 1.9 MG/DL (ref 2.5–4.9)
PHOSPHATE SERPL-MCNC: 1.9 MG/DL (ref 2.5–4.9)
PHOSPHATE SERPL-MCNC: 2.4 MG/DL (ref 2.5–4.9)
PHOSPHATE SERPL-MCNC: 2.7 MG/DL (ref 2.5–4.9)
PHOSPHATE SERPL-MCNC: 2.7 MG/DL (ref 2.5–4.9)
PHOSPHATE SERPL-MCNC: 3.1 MG/DL (ref 2.5–4.9)
PHOSPHATE SERPL-MCNC: 3.7 MG/DL (ref 2.5–4.9)
PHOSPHATE SERPL-MCNC: 3.9 MG/DL (ref 2.5–4.9)
PHOSPHATE SERPL-MCNC: 4.3 MG/DL (ref 2.5–4.9)
PHOSPHATE SERPL-MCNC: 4.3 MG/DL (ref 2.5–4.9)
PHOSPHATE SERPL-MCNC: 4.5 MG/DL (ref 2.5–4.9)
PHOSPHATE SERPL-MCNC: 5 MG/DL (ref 2.5–4.9)
PHOSPHATE SERPL-MCNC: 5.3 MG/DL (ref 2.5–4.9)
PLATELET # BLD AUTO: 208 10(3)UL (ref 150–450)
PLATELET # BLD AUTO: 211 10(3)UL (ref 150–450)
PLATELET # BLD AUTO: 243 10(3)UL (ref 150–450)
PLATELET # BLD AUTO: 264 10(3)UL (ref 150–450)
PLATELET # BLD AUTO: 331 10(3)UL (ref 150–450)
PLATELET # BLD AUTO: 359 10(3)UL (ref 150–450)
PLATELET # BLD AUTO: 361 10(3)UL (ref 150–450)
PLATELET # BLD AUTO: 379 10(3)UL (ref 150–450)
PLATELET # BLD AUTO: 391 10(3)UL (ref 150–450)
PLATELET # BLD AUTO: 398 10(3)UL (ref 150–450)
PLATELET # BLD AUTO: 401 10(3)UL (ref 150–450)
PLATELET # BLD AUTO: 429 10(3)UL (ref 150–450)
PLATELET # BLD AUTO: 441 10(3)UL (ref 150–450)
PLATELET # BLD AUTO: 466 10(3)UL (ref 150–450)
PLATELET MORPHOLOGY: NORMAL
POTASSIUM SERPL-SCNC: 3.2 MMOL/L (ref 3.6–5.1)
POTASSIUM SERPL-SCNC: 3.6 MMOL/L (ref 3.6–5.1)
POTASSIUM SERPL-SCNC: 3.8 MMOL/L (ref 3.6–5.1)
POTASSIUM SERPL-SCNC: 3.8 MMOL/L (ref 3.6–5.1)
POTASSIUM SERPL-SCNC: 3.9 MMOL/L (ref 3.6–5.1)
POTASSIUM SERPL-SCNC: 4 MMOL/L (ref 3.6–5.1)
POTASSIUM SERPL-SCNC: 4.1 MMOL/L (ref 3.6–5.1)
POTASSIUM SERPL-SCNC: 4.2 MMOL/L (ref 3.6–5.1)
POTASSIUM SERPL-SCNC: 4.4 MMOL/L (ref 3.6–5.1)
POTASSIUM SERPL-SCNC: 4.6 MMOL/L (ref 3.6–5.1)
POTASSIUM SERPL-SCNC: 4.7 MMOL/L (ref 3.6–5.1)
POTASSIUM SERPL-SCNC: 4.7 MMOL/L (ref 3.6–5.1)
POTASSIUM SERPL-SCNC: 5.3 MMOL/L (ref 3.6–5.1)
POTASSIUM SERPL-SCNC: 5.4 MMOL/L (ref 3.6–5.1)
PROT UR STRIP.AUTO-MCNC: NEGATIVE MG/DL
PSA SERPL DL<=0.01 NG/ML-MCNC: 12.3 SECONDS (ref 12–14.3)
PSA SERPL DL<=0.01 NG/ML-MCNC: 14.3 SECONDS (ref 12.3–14.8)
Q-T INTERVAL: 400 MS
Q-T INTERVAL: 436 MS
QRS DURATION: 88 MS
QRS DURATION: 88 MS
QTC CALCULATION (BEZET): 461 MS
QTC CALCULATION (BEZET): 486 MS
R AXIS: 24 DEGREES
R AXIS: 28 DEGREES
RBC # BLD AUTO: 2.38 X10(6)UL (ref 3.8–5.1)
RBC # BLD AUTO: 2.73 X10(6)UL (ref 3.8–5.1)
RBC # BLD AUTO: 2.8 X10(6)UL (ref 3.8–5.1)
RBC # BLD AUTO: 2.9 X10(6)UL (ref 3.8–5.1)
RBC # BLD AUTO: 2.9 X10(6)UL (ref 3.8–5.1)
RBC # BLD AUTO: 3.07 X10(6)UL (ref 3.8–5.1)
RBC # BLD AUTO: 3.1 X10(6)UL (ref 3.8–5.1)
RBC # BLD AUTO: 3.13 X10(6)UL (ref 3.8–5.1)
RBC # BLD AUTO: 3.14 X10(6)UL (ref 3.8–5.1)
RBC # BLD AUTO: 3.2 X10(6)UL (ref 3.8–5.1)
RBC # BLD AUTO: 3.29 X10(6)UL (ref 3.8–5.1)
RBC # BLD AUTO: 3.7 X10(6)UL (ref 3.8–5.1)
RBC # BLD AUTO: 3.84 X10(6)UL (ref 3.8–5.1)
RBC UR QL AUTO: NEGATIVE
RED CELL DISTRIBUTION WIDTH-SD: 54.9 FL (ref 35.1–46.3)
RED CELL DISTRIBUTION WIDTH-SD: 65.6 FL (ref 35.1–46.3)
RED CELL DISTRIBUTION WIDTH-SD: 67.5 FL (ref 35.1–46.3)
RED CELL DISTRIBUTION WIDTH-SD: 67.6 FL (ref 35.1–46.3)
RED CELL DISTRIBUTION WIDTH-SD: 67.9 FL (ref 35.1–46.3)
RED CELL DISTRIBUTION WIDTH-SD: 69.7 FL (ref 35.1–46.3)
RED CELL DISTRIBUTION WIDTH-SD: 73.1 FL (ref 35.1–46.3)
RED CELL DISTRIBUTION WIDTH-SD: 73.3 FL (ref 35.1–46.3)
RED CELL DISTRIBUTION WIDTH-SD: 75.6 FL (ref 35.1–46.3)
RED CELL DISTRIBUTION WIDTH-SD: 75.6 FL (ref 35.1–46.3)
RED CELL DISTRIBUTION WIDTH-SD: 84.7 FL (ref 35.1–46.3)
RED CELL DISTRIBUTION WIDTH-SD: 85.2 FL (ref 35.1–46.3)
RED CELL DISTRIBUTION WIDTH-SD: 86.3 FL (ref 35.1–46.3)
RETIC ABS CALC AUTO: 3.8 X10(3) UL (ref 22.5–147.5)
RETIC IRF CALC: 0.6 RATIO (ref 0.09–0.3)
RETIC%: 0.1 % (ref 0.5–2.5)
RETICULOCYTE HEMOGLOBIN EQUIVALENT: 27.9 PG (ref 28.2–36.3)
RH BLOOD TYPE: POSITIVE
RH BLOOD TYPE: POSITIVE
SODIUM SERPL-SCNC: 134 MMOL/L (ref 136–144)
SODIUM SERPL-SCNC: 138 MMOL/L (ref 136–144)
SODIUM SERPL-SCNC: 139 MMOL/L (ref 136–144)
SODIUM SERPL-SCNC: 139 MMOL/L (ref 136–144)
SODIUM SERPL-SCNC: 141 MMOL/L (ref 136–144)
SODIUM SERPL-SCNC: 142 MMOL/L (ref 136–144)
SODIUM SERPL-SCNC: 143 MMOL/L (ref 136–144)
SODIUM SERPL-SCNC: 143 MMOL/L (ref 136–144)
SODIUM SERPL-SCNC: 145 MMOL/L (ref 136–144)
SODIUM SERPL-SCNC: 146 MMOL/L (ref 136–144)
SODIUM SERPL-SCNC: 146 MMOL/L (ref 136–144)
SP GR UR STRIP.AUTO: 1.01 (ref 1–1.03)
T AXIS: -75 DEGREES
T AXIS: 46 DEGREES
TOTAL IRON BINDING CAPACITY: 373 UG/DL (ref 298–536)
TRANSFERRIN: 250 MG/DL (ref 200–360)
TRIGLYCERIDES: 141 MG/DL (ref ?–150)
TRIGLYCERIDES: 183 MG/DL (ref ?–150)
TRIGLYCERIDES: 87 MG/DL (ref ?–150)
TROPONIN: <0.046 NG/ML (ref ?–0.05)
TSI SER-ACNC: 2.26 MIU/ML (ref 0.35–5.5)
TSI SER-ACNC: 6.87 MIU/ML (ref 0.35–5.5)
UROBILINOGEN UR STRIP.AUTO-MCNC: <2 MG/DL
VENTRICULAR RATE: 75 BPM
VENTRICULAR RATE: 80 BPM
WBC # BLD AUTO: 10.4 X10(3) UL (ref 4–13)
WBC # BLD AUTO: 12.8 X10(3) UL (ref 4–13)
WBC # BLD AUTO: 14.7 X10(3) UL (ref 4–13)
WBC # BLD AUTO: 4.2 X10(3) UL (ref 4–13)
WBC # BLD AUTO: 4.5 X10(3) UL (ref 4–13)
WBC # BLD AUTO: 5.4 X10(3) UL (ref 4–13)
WBC # BLD AUTO: 5.5 X10(3) UL (ref 4–13)
WBC # BLD AUTO: 6.1 X10(3) UL (ref 4–13)
WBC # BLD AUTO: 6.5 X10(3) UL (ref 4–13)
WBC # BLD AUTO: 6.6 X10(3) UL (ref 4–13)
WBC # BLD AUTO: 7.4 X10(3) UL (ref 4–13)
WBC # BLD AUTO: 7.4 X10(3) UL (ref 4–13)
WBC # BLD AUTO: 9.3 X10(3) UL (ref 4–13)

## 2017-01-01 PROCEDURE — 85025 COMPLETE CBC W/AUTO DIFF WBC: CPT

## 2017-01-01 PROCEDURE — 82962 GLUCOSE BLOOD TEST: CPT

## 2017-01-01 PROCEDURE — 99232 SBSQ HOSP IP/OBS MODERATE 35: CPT | Performed by: HOSPITALIST

## 2017-01-01 PROCEDURE — 82272 OCCULT BLD FECES 1-3 TESTS: CPT

## 2017-01-01 PROCEDURE — 74177 CT ABD & PELVIS W/CONTRAST: CPT

## 2017-01-01 PROCEDURE — 76857 US EXAM PELVIC LIMITED: CPT

## 2017-01-01 PROCEDURE — 74415 UROGRAPHY NFS DRIP&/BLS W/NF: CPT

## 2017-01-01 PROCEDURE — 74020 XR ABDOMEN, OBSTRUCTIVE SERIES (CPT=74020): CPT

## 2017-01-01 PROCEDURE — 0DBB0ZZ EXCISION OF ILEUM, OPEN APPROACH: ICD-10-PCS | Performed by: SURGERY

## 2017-01-01 PROCEDURE — 99232 SBSQ HOSP IP/OBS MODERATE 35: CPT | Performed by: INTERNAL MEDICINE

## 2017-01-01 PROCEDURE — 30233N1 TRANSFUSION OF NONAUTOLOGOUS RED BLOOD CELLS INTO PERIPHERAL VEIN, PERCUTANEOUS APPROACH: ICD-10-PCS | Performed by: HOSPITALIST

## 2017-01-01 PROCEDURE — 99215 OFFICE O/P EST HI 40 MIN: CPT | Performed by: INTERNAL MEDICINE

## 2017-01-01 PROCEDURE — 85045 AUTOMATED RETICULOCYTE COUNT: CPT

## 2017-01-01 PROCEDURE — 07BC0ZZ EXCISION OF PELVIS LYMPHATIC, OPEN APPROACH: ICD-10-PCS | Performed by: SURGERY

## 2017-01-01 PROCEDURE — 74230 X-RAY XM SWLNG FUNCJ C+: CPT

## 2017-01-01 PROCEDURE — 99214 OFFICE O/P EST MOD 30 MIN: CPT | Performed by: INTERNAL MEDICINE

## 2017-01-01 PROCEDURE — 99223 1ST HOSP IP/OBS HIGH 75: CPT | Performed by: INTERNAL MEDICINE

## 2017-01-01 PROCEDURE — 82728 ASSAY OF FERRITIN: CPT

## 2017-01-01 PROCEDURE — 99309 SBSQ NF CARE MODERATE MDM 30: CPT | Performed by: INTERNAL MEDICINE

## 2017-01-01 PROCEDURE — 99214 OFFICE O/P EST MOD 30 MIN: CPT | Performed by: FAMILY MEDICINE

## 2017-01-01 PROCEDURE — 0DTF0ZZ RESECTION OF RIGHT LARGE INTESTINE, OPEN APPROACH: ICD-10-PCS | Performed by: SURGERY

## 2017-01-01 PROCEDURE — 82746 ASSAY OF FOLIC ACID SERUM: CPT

## 2017-01-01 PROCEDURE — 82378 CARCINOEMBRYONIC ANTIGEN: CPT

## 2017-01-01 PROCEDURE — 80053 COMPREHEN METABOLIC PANEL: CPT

## 2017-01-01 PROCEDURE — 0WBF0ZZ EXCISION OF ABDOMINAL WALL, OPEN APPROACH: ICD-10-PCS | Performed by: SURGERY

## 2017-01-01 PROCEDURE — 83550 IRON BINDING TEST: CPT

## 2017-01-01 PROCEDURE — 36415 COLL VENOUS BLD VENIPUNCTURE: CPT

## 2017-01-01 PROCEDURE — 84439 ASSAY OF FREE THYROXINE: CPT

## 2017-01-01 PROCEDURE — 36592 COLLECT BLOOD FROM PICC: CPT

## 2017-01-01 PROCEDURE — 99223 1ST HOSP IP/OBS HIGH 75: CPT | Performed by: SURGERY

## 2017-01-01 PROCEDURE — 99024 POSTOP FOLLOW-UP VISIT: CPT | Performed by: SURGERY

## 2017-01-01 PROCEDURE — 99215 OFFICE O/P EST HI 40 MIN: CPT | Performed by: FAMILY MEDICINE

## 2017-01-01 PROCEDURE — 02HV33Z INSERTION OF INFUSION DEVICE INTO SUPERIOR VENA CAVA, PERCUTANEOUS APPROACH: ICD-10-PCS | Performed by: HOSPITALIST

## 2017-01-01 PROCEDURE — 0UB00ZZ EXCISION OF RIGHT OVARY, OPEN APPROACH: ICD-10-PCS | Performed by: SURGERY

## 2017-01-01 PROCEDURE — 71260 CT THORAX DX C+: CPT

## 2017-01-01 PROCEDURE — 3E0336Z INTRODUCTION OF NUTRITIONAL SUBSTANCE INTO PERIPHERAL VEIN, PERCUTANEOUS APPROACH: ICD-10-PCS | Performed by: HOSPITALIST

## 2017-01-01 PROCEDURE — 99496 TRANSJ CARE MGMT HIGH F2F 7D: CPT | Performed by: FAMILY MEDICINE

## 2017-01-01 PROCEDURE — B548ZZA ULTRASONOGRAPHY OF SUPERIOR VENA CAVA, GUIDANCE: ICD-10-PCS | Performed by: HOSPITALIST

## 2017-01-01 PROCEDURE — 0W9G3ZZ DRAINAGE OF PERITONEAL CAVITY, PERCUTANEOUS APPROACH: ICD-10-PCS | Performed by: SURGERY

## 2017-01-01 PROCEDURE — 84443 ASSAY THYROID STIM HORMONE: CPT

## 2017-01-01 PROCEDURE — 82306 VITAMIN D 25 HYDROXY: CPT

## 2017-01-01 PROCEDURE — 83540 ASSAY OF IRON: CPT

## 2017-01-01 PROCEDURE — 99239 HOSP IP/OBS DSCHRG MGMT >30: CPT | Performed by: HOSPITALIST

## 2017-01-01 PROCEDURE — 99305 1ST NF CARE MODERATE MDM 35: CPT | Performed by: INTERNAL MEDICINE

## 2017-01-01 PROCEDURE — 3E0336Z INTRODUCTION OF NUTRITIONAL SUBSTANCE INTO PERIPHERAL VEIN, PERCUTANEOUS APPROACH: ICD-10-PCS | Performed by: INTERNAL MEDICINE

## 2017-01-01 PROCEDURE — 93971 EXTREMITY STUDY: CPT

## 2017-01-01 PROCEDURE — 76775 US EXAM ABDO BACK WALL LIM: CPT

## 2017-01-01 PROCEDURE — 0UB50ZZ EXCISION OF RIGHT FALLOPIAN TUBE, OPEN APPROACH: ICD-10-PCS | Performed by: SURGERY

## 2017-01-01 PROCEDURE — 78815 PET IMAGE W/CT SKULL-THIGH: CPT

## 2017-01-01 PROCEDURE — 99239 HOSP IP/OBS DSCHRG MGMT >30: CPT | Performed by: INTERNAL MEDICINE

## 2017-01-01 PROCEDURE — 0D1B0Z4 BYPASS ILEUM TO CUTANEOUS, OPEN APPROACH: ICD-10-PCS | Performed by: SURGERY

## 2017-01-01 PROCEDURE — 99213 OFFICE O/P EST LOW 20 MIN: CPT | Performed by: FAMILY MEDICINE

## 2017-01-01 PROCEDURE — 74220 X-RAY XM ESOPHAGUS 1CNTRST: CPT

## 2017-01-01 PROCEDURE — 82668 ASSAY OF ERYTHROPOIETIN: CPT

## 2017-01-01 PROCEDURE — 82607 VITAMIN B-12: CPT

## 2017-01-01 PROCEDURE — 71010 XR CHEST AP PORTABLE  (CPT=71010): CPT

## 2017-01-01 RX ORDER — HEPARIN SODIUM 5000 [USP'U]/ML
5000 INJECTION, SOLUTION INTRAVENOUS; SUBCUTANEOUS ONCE
Status: COMPLETED | OUTPATIENT
Start: 2017-01-01 | End: 2017-01-01

## 2017-01-01 RX ORDER — ACETAMINOPHEN 10 MG/ML
1000 INJECTION, SOLUTION INTRAVENOUS EVERY 6 HOURS
Status: DISCONTINUED | OUTPATIENT
Start: 2017-01-01 | End: 2017-01-01

## 2017-01-01 RX ORDER — NALBUPHINE HCL 10 MG/ML
2.5 AMPUL (ML) INJECTION EVERY 4 HOURS PRN
Status: DISCONTINUED | OUTPATIENT
Start: 2017-01-01 | End: 2017-01-01

## 2017-01-01 RX ORDER — SODIUM CHLORIDE, SODIUM LACTATE, POTASSIUM CHLORIDE, CALCIUM CHLORIDE 600; 310; 30; 20 MG/100ML; MG/100ML; MG/100ML; MG/100ML
INJECTION, SOLUTION INTRAVENOUS CONTINUOUS
Status: DISCONTINUED | OUTPATIENT
Start: 2017-01-01 | End: 2017-01-01

## 2017-01-01 RX ORDER — DRONABINOL 2.5 MG/1
2.5 CAPSULE ORAL
Qty: 60 CAPSULE | Refills: 0 | Status: SHIPPED | COMMUNITY
Start: 2017-01-01

## 2017-01-01 RX ORDER — FAMOTIDINE 10 MG/ML
20 INJECTION, SOLUTION INTRAVENOUS NIGHTLY
Status: DISCONTINUED | OUTPATIENT
Start: 2017-01-01 | End: 2017-01-01

## 2017-01-01 RX ORDER — MORPHINE SULFATE 4 MG/ML
4 INJECTION, SOLUTION INTRAMUSCULAR; INTRAVENOUS EVERY 2 HOUR PRN
Status: DISCONTINUED | OUTPATIENT
Start: 2017-01-01 | End: 2017-01-01

## 2017-01-01 RX ORDER — NALOXONE HYDROCHLORIDE 0.4 MG/ML
0.08 INJECTION, SOLUTION INTRAMUSCULAR; INTRAVENOUS; SUBCUTANEOUS
Status: DISCONTINUED | OUTPATIENT
Start: 2017-01-01 | End: 2017-01-01

## 2017-01-01 RX ORDER — HYDROMORPHONE HYDROCHLORIDE 1 MG/ML
0.4 INJECTION, SOLUTION INTRAMUSCULAR; INTRAVENOUS; SUBCUTANEOUS EVERY 2 HOUR PRN
Status: DISCONTINUED | OUTPATIENT
Start: 2017-01-01 | End: 2017-01-01

## 2017-01-01 RX ORDER — CHLORAL HYDRATE 500 MG
1000 CAPSULE ORAL NIGHTLY
COMMUNITY

## 2017-01-01 RX ORDER — MORPHINE SULFATE 2 MG/ML
2 INJECTION, SOLUTION INTRAMUSCULAR; INTRAVENOUS EVERY 2 HOUR PRN
Status: DISCONTINUED | OUTPATIENT
Start: 2017-01-01 | End: 2017-01-01

## 2017-01-01 RX ORDER — ONDANSETRON 2 MG/ML
4 INJECTION INTRAMUSCULAR; INTRAVENOUS EVERY 6 HOURS PRN
Status: DISCONTINUED | OUTPATIENT
Start: 2017-01-01 | End: 2017-01-01

## 2017-01-01 RX ORDER — DOXYCYCLINE HYCLATE 50 MG/1
325 CAPSULE, GELATIN COATED ORAL 2 TIMES DAILY
Qty: 60 TABLET | Refills: 0 | Status: ON HOLD | COMMUNITY
Start: 2017-01-01 | End: 2017-01-01 | Stop reason: ALTCHOICE

## 2017-01-01 RX ORDER — MELATONIN
325
Qty: 30 TABLET | Refills: 2 | Status: SHIPPED | OUTPATIENT
Start: 2017-01-01 | End: 2017-01-01 | Stop reason: SINTOL

## 2017-01-01 RX ORDER — FAMOTIDINE 10 MG/ML
INJECTION, SOLUTION INTRAVENOUS
Status: COMPLETED
Start: 2017-01-01 | End: 2017-01-01

## 2017-01-01 RX ORDER — SODIUM POLYSTYRENE SULFONATE 15 G/60ML
15 SUSPENSION ORAL; RECTAL ONCE
Status: DISCONTINUED | OUTPATIENT
Start: 2017-01-01 | End: 2017-01-01

## 2017-01-01 RX ORDER — MULTIPLE VITAMINS W/ MINERALS TAB 9MG-400MCG
1 TAB ORAL DAILY
Status: DISCONTINUED | OUTPATIENT
Start: 2017-01-01 | End: 2017-01-01

## 2017-01-01 RX ORDER — POTASSIUM CHLORIDE 14.9 MG/ML
20 INJECTION INTRAVENOUS ONCE
Status: COMPLETED | OUTPATIENT
Start: 2017-01-01 | End: 2017-01-01

## 2017-01-01 RX ORDER — CHOLESTYRAMINE LIGHT 4 G/5.7G
4 POWDER, FOR SUSPENSION ORAL DAILY
Qty: 30 PACKET | Refills: 0 | Status: SHIPPED | OUTPATIENT
Start: 2017-01-01 | End: 2017-01-01

## 2017-01-01 RX ORDER — SODIUM CHLORIDE 9 MG/ML
INJECTION, SOLUTION INTRAVENOUS CONTINUOUS
Status: DISCONTINUED | OUTPATIENT
Start: 2017-01-01 | End: 2017-01-01

## 2017-01-01 RX ORDER — PROCHLORPERAZINE MALEATE 10 MG
10 TABLET ORAL EVERY 6 HOURS PRN
Qty: 30 TABLET | Refills: 3 | Status: SHIPPED | OUTPATIENT
Start: 2017-01-01 | End: 2017-05-18

## 2017-01-01 RX ORDER — METRONIDAZOLE 500 MG/100ML
500 INJECTION, SOLUTION INTRAVENOUS ONCE
Status: COMPLETED | OUTPATIENT
Start: 2017-01-01 | End: 2017-01-01

## 2017-01-01 RX ORDER — CHLORAL HYDRATE 500 MG
1000 CAPSULE ORAL NIGHTLY
Status: DISCONTINUED | OUTPATIENT
Start: 2017-01-01 | End: 2017-01-01 | Stop reason: RX

## 2017-01-01 RX ORDER — MULTIVITAMIN
TABLET ORAL
COMMUNITY
End: 2017-01-01 | Stop reason: CLARIF

## 2017-01-01 RX ORDER — ACETAMINOPHEN 500 MG
1000 TABLET ORAL ONCE
Status: DISCONTINUED | OUTPATIENT
Start: 2017-01-01 | End: 2017-01-01

## 2017-01-01 RX ORDER — CHOLESTYRAMINE 4 G/9G
4 POWDER, FOR SUSPENSION ORAL DAILY
Qty: 30 PACKET | Refills: 6 | Status: SHIPPED | OUTPATIENT
Start: 2017-01-01 | End: 2017-05-17

## 2017-01-01 RX ORDER — ACETAMINOPHEN 325 MG/1
650 TABLET ORAL EVERY 6 HOURS PRN
Status: DISCONTINUED | OUTPATIENT
Start: 2017-01-01 | End: 2017-01-01

## 2017-01-01 RX ORDER — HYDROCODONE BITARTRATE AND ACETAMINOPHEN 5; 325 MG/1; MG/1
1-2 TABLET ORAL
Qty: 30 TABLET | Refills: 0 | Status: ON HOLD | OUTPATIENT
Start: 2017-01-01 | End: 2017-01-01

## 2017-01-01 RX ORDER — DEXTROSE MONOHYDRATE 25 G/50ML
INJECTION, SOLUTION INTRAVENOUS
Status: COMPLETED
Start: 2017-01-01 | End: 2017-01-01

## 2017-01-01 RX ORDER — CAPECITABINE 500 MG/1
1000 TABLET, FILM COATED ORAL 2 TIMES DAILY
Qty: 56 TABLET | Refills: 6 | Status: SHIPPED | OUTPATIENT
Start: 2017-01-01 | End: 2017-01-01

## 2017-01-01 RX ORDER — SODIUM PHOSPHATE, DIBASIC AND SODIUM PHOSPHATE, MONOBASIC 7; 19 G/133ML; G/133ML
1 ENEMA RECTAL ONCE AS NEEDED
Status: DISCONTINUED | OUTPATIENT
Start: 2017-01-01 | End: 2017-01-01 | Stop reason: HOSPADM

## 2017-01-01 RX ORDER — POTASSIUM CHLORIDE 14.9 MG/ML
20 INJECTION INTRAVENOUS ONCE
Status: DISCONTINUED | OUTPATIENT
Start: 2017-01-01 | End: 2017-01-01

## 2017-01-01 RX ORDER — HYDROCODONE BITARTRATE AND ACETAMINOPHEN 5; 325 MG/1; MG/1
1-2 TABLET ORAL EVERY 4 HOURS PRN
Status: DISCONTINUED | OUTPATIENT
Start: 2017-01-01 | End: 2017-01-01

## 2017-01-01 RX ORDER — BUPRENORPHINE HCL 8 MG/1
1 TABLET SUBLINGUAL DAILY
COMMUNITY

## 2017-01-01 RX ORDER — DIPHENHYDRAMINE HYDROCHLORIDE 50 MG/ML
25 INJECTION INTRAMUSCULAR; INTRAVENOUS ONCE
Status: COMPLETED | OUTPATIENT
Start: 2017-01-01 | End: 2017-01-01

## 2017-01-01 RX ORDER — SODIUM CHLORIDE 9 MG/ML
INJECTION, SOLUTION INTRAVENOUS ONCE
Status: COMPLETED | OUTPATIENT
Start: 2017-01-01 | End: 2017-01-01

## 2017-01-01 RX ORDER — ACETAMINOPHEN 325 MG/1
650 TABLET ORAL EVERY 6 HOURS PRN
Qty: 30 TABLET | Refills: 0 | Status: SHIPPED | OUTPATIENT
Start: 2017-01-01

## 2017-01-01 RX ORDER — HYDROMORPHONE HYDROCHLORIDE 1 MG/ML
0.4 INJECTION, SOLUTION INTRAMUSCULAR; INTRAVENOUS; SUBCUTANEOUS EVERY 5 MIN PRN
Status: DISCONTINUED | OUTPATIENT
Start: 2017-01-01 | End: 2017-01-01 | Stop reason: HOSPADM

## 2017-01-01 RX ORDER — HEPARIN SODIUM 5000 [USP'U]/ML
5000 INJECTION, SOLUTION INTRAVENOUS; SUBCUTANEOUS EVERY 8 HOURS
Status: DISCONTINUED | OUTPATIENT
Start: 2017-01-01 | End: 2017-01-01

## 2017-01-01 RX ORDER — DEXAMETHASONE SODIUM PHOSPHATE 4 MG/ML
VIAL (ML) INJECTION
Status: DISCONTINUED | OUTPATIENT
Start: 2017-01-01 | End: 2017-01-01 | Stop reason: HOSPADM

## 2017-01-01 RX ORDER — DEXTROSE MONOHYDRATE 25 G/50ML
50 INJECTION, SOLUTION INTRAVENOUS AS NEEDED
Status: DISCONTINUED | OUTPATIENT
Start: 2017-01-01 | End: 2017-01-01

## 2017-01-01 RX ORDER — METOPROLOL TARTRATE 5 MG/5ML
5 INJECTION INTRAVENOUS EVERY 6 HOURS PRN
Status: DISCONTINUED | OUTPATIENT
Start: 2017-01-01 | End: 2017-01-01

## 2017-01-01 RX ORDER — TEMAZEPAM 15 MG/1
15 CAPSULE ORAL NIGHTLY PRN
Status: DISCONTINUED | OUTPATIENT
Start: 2017-01-01 | End: 2017-01-01

## 2017-01-01 RX ORDER — DEXTROSE AND SODIUM CHLORIDE 5; .9 G/100ML; G/100ML
INJECTION, SOLUTION INTRAVENOUS CONTINUOUS
Status: DISCONTINUED | OUTPATIENT
Start: 2017-01-01 | End: 2017-01-01

## 2017-01-01 RX ORDER — DEXTROSE MONOHYDRATE 100 MG/ML
INJECTION, SOLUTION INTRAVENOUS CONTINUOUS PRN
Status: DISCONTINUED | OUTPATIENT
Start: 2017-01-01 | End: 2017-01-01

## 2017-01-01 RX ORDER — HYDROMORPHONE HYDROCHLORIDE 1 MG/ML
0.2 INJECTION, SOLUTION INTRAMUSCULAR; INTRAVENOUS; SUBCUTANEOUS EVERY 2 HOUR PRN
Status: DISCONTINUED | OUTPATIENT
Start: 2017-01-01 | End: 2017-01-01

## 2017-01-01 RX ORDER — DIPHENOXYLATE HYDROCHLORIDE AND ATROPINE SULFATE 2.5; .025 MG/1; MG/1
1 TABLET ORAL 3 TIMES DAILY
Status: DISCONTINUED | OUTPATIENT
Start: 2017-01-01 | End: 2017-01-01

## 2017-01-01 RX ORDER — SODIUM CHLORIDE 0.9 % (FLUSH) 0.9 %
10 SYRINGE (ML) INJECTION AS NEEDED
Status: DISCONTINUED | OUTPATIENT
Start: 2017-01-01 | End: 2017-01-01

## 2017-01-01 RX ORDER — MORPHINE SULFATE 2 MG/ML
1 INJECTION, SOLUTION INTRAMUSCULAR; INTRAVENOUS EVERY 2 HOUR PRN
Status: DISCONTINUED | OUTPATIENT
Start: 2017-01-01 | End: 2017-01-01

## 2017-01-01 RX ORDER — DIPHENHYDRAMINE HYDROCHLORIDE 50 MG/ML
12.5 INJECTION INTRAMUSCULAR; INTRAVENOUS EVERY 4 HOURS PRN
Status: DISCONTINUED | OUTPATIENT
Start: 2017-01-01 | End: 2017-01-01

## 2017-01-01 RX ORDER — FAMOTIDINE 20 MG/1
20 TABLET ORAL NIGHTLY
Status: DISCONTINUED | OUTPATIENT
Start: 2017-01-01 | End: 2017-01-01

## 2017-01-01 RX ORDER — POTASSIUM CHLORIDE 29.8 MG/ML
40 INJECTION INTRAVENOUS ONCE
Status: DISCONTINUED | OUTPATIENT
Start: 2017-01-01 | End: 2017-01-01

## 2017-01-01 RX ORDER — ONDANSETRON 2 MG/ML
INJECTION INTRAMUSCULAR; INTRAVENOUS
Status: DISCONTINUED | OUTPATIENT
Start: 2017-01-01 | End: 2017-01-01 | Stop reason: HOSPADM

## 2017-01-01 RX ORDER — DIPHENOXYLATE HYDROCHLORIDE AND ATROPINE SULFATE 2.5; .025 MG/1; MG/1
1 TABLET ORAL 3 TIMES DAILY
COMMUNITY

## 2017-01-01 RX ORDER — MEGESTROL ACETATE 40 MG/ML
400 SUSPENSION ORAL DAILY
Status: DISCONTINUED | OUTPATIENT
Start: 2017-01-01 | End: 2017-01-01

## 2017-01-01 RX ORDER — HYDROMORPHONE HYDROCHLORIDE 1 MG/ML
INJECTION, SOLUTION INTRAMUSCULAR; INTRAVENOUS; SUBCUTANEOUS
Status: COMPLETED
Start: 2017-01-01 | End: 2017-01-01

## 2017-01-01 RX ORDER — ONDANSETRON 2 MG/ML
4 INJECTION INTRAMUSCULAR; INTRAVENOUS AS NEEDED
Status: DISCONTINUED | OUTPATIENT
Start: 2017-01-01 | End: 2017-01-01 | Stop reason: HOSPADM

## 2017-01-01 RX ORDER — CHOLESTYRAMINE LIGHT 4 G/5.7G
4 POWDER, FOR SUSPENSION ORAL DAILY
Status: DISCONTINUED | OUTPATIENT
Start: 2017-01-01 | End: 2017-01-01

## 2017-01-01 RX ORDER — HYDROCODONE BITARTRATE AND ACETAMINOPHEN 5; 325 MG/1; MG/1
1 TABLET ORAL ONCE
Status: COMPLETED | OUTPATIENT
Start: 2017-01-01 | End: 2017-01-01

## 2017-01-01 RX ORDER — MEGESTROL ACETATE 40 MG/ML
400 SUSPENSION ORAL DAILY
Qty: 300 ML | Refills: 0 | Status: SHIPPED | OUTPATIENT
Start: 2017-01-01 | End: 2017-01-01

## 2017-01-01 RX ORDER — NALOXONE HYDROCHLORIDE 0.4 MG/ML
80 INJECTION, SOLUTION INTRAMUSCULAR; INTRAVENOUS; SUBCUTANEOUS AS NEEDED
Status: DISCONTINUED | OUTPATIENT
Start: 2017-01-01 | End: 2017-01-01 | Stop reason: HOSPADM

## 2017-01-16 NOTE — TELEPHONE ENCOUNTER
We do not know whether the EKG will or will not answer any of the questions. I think the best option is really to have an office visit at which time we can do an EKG and other tests if needed.   I would have a lot of questions about her blood count possibl

## 2017-01-16 NOTE — TELEPHONE ENCOUNTER
Bk Quinteros states that when pt exerts herself  Such as waling up stairs or walking in a parking lot, her heart races and pounds but pt does not c/o dizziness or lightheadedness.  Bk Quinteros is wanting to know if this could be due to pt's weight loss and lack of a

## 2017-01-30 NOTE — PROGRESS NOTES
Dotty Single is a 80year old female. Patient presents with:  Rapid Heart Beat: heart \"thuds\" when getting up. . very fatigued. Mauricio Sabrina swelling in legs after travel. . noticed August 2016. .room 6      HPI:   Patient here for evaluation of her fatigue.     Ortiz Kaufman lb  12/17/12 : 100 lb      REVIEW OF SYSTEMS:   GENERAL HEALTH:see HPI  Wt loss  SKIN: denies any unusual skin lesions or rashes  RESPIRATORY: denies shortness of breath with exertion  CARDIOVASCULAR: denies chest pain on exertion  Palpitations after fligh testing. No orders of the defined types were placed in this encounter.          Orders Placed This Encounter  Comp Metabolic Panel (14) [E]  Standing Status: Future  Number of Occurrences: 1  Standing Expiration Date: 1/30/2018  CBC W Differential W Pl

## 2017-01-30 NOTE — PATIENT INSTRUCTIONS
Eating a Vegetarian Diet  A vegetarian diet is based on plant foods. It includes fruits, vegetables, beans, grains, seeds, and nuts. Some vegetarians also eat dairy foods and eggs.  There are three common vegetarian diets:  · Lacto-ovo vegetarians eat egg A vegetarian diet can easily supply all the calories, protein, vitamins, and minerals that a person needs. But some people have special needs. They may include children and teens, pregnant and lactating women, women past midlife, the elderly, and vegans.  I · Choose a wide range of grains and whole-grain breads and cereals. Eat six or more servings of these foods each day. · Begin to replace meat by working up to two to three servings a day of beans, lentils, split peas, tofu, or tempeh.   · If you eat dairy

## 2017-01-31 NOTE — TELEPHONE ENCOUNTER
Yes---I would like to see early next week  And we will discuss  Poss gi consult  And also ck cbc again    We gave FIT occult test also

## 2017-01-31 NOTE — TELEPHONE ENCOUNTER
Patient notified and stated several times the urgency to have this performed today. Patient states she will try to go today but she has other plans today.  Explained that her hemoglobin is very low and it is critical that she have her blood transfusion toda

## 2017-01-31 NOTE — TELEPHONE ENCOUNTER
LMTCB. Lab called with critical HgB 5.3. Looking at office visit today pt quite symptomatic, but also sounds like symptoms have been going on for some time.  Left message indicating I was calling to go over result and discuss possibility of going to [facility]

## 2017-01-31 NOTE — TELEPHONE ENCOUNTER
Notes Recorded by Laura Johnson MD on 1/31/2017 at 8:51 AM  Very low cbc!     Most else either normal  Or simply related to the low hgb    She needs blood    The best quickest result is to go to [facility] for t+x and transfusion of 2 u prbcs, and post tr

## 2017-01-31 NOTE — TELEPHONE ENCOUNTER
Information reviewed with Efra Perea. She states the patient is having a neighbor drive her over to the hospital. Efra Perea is wanting to know the next step. What is the cause?  Does Dr. Tobin Has think it is GI related, would he want a hemocult test? When would s

## 2017-01-31 NOTE — TELEPHONE ENCOUNTER
Message  Received: Today       Richar Arrington Nurse       Caller: Daughter Jenae Tian (Today, 8:07 AM)                     Daughter Jenae Tian is returning call from Dr Charlette Ortiz from last night's .  Advised daughter to call Dr Oc Hoffmann office [facility]

## 2017-02-06 PROBLEM — D64.9 ANEMIA: Status: ACTIVE | Noted: 2017-01-01

## 2017-02-06 PROBLEM — D50.9 MICROCYTIC HYPOCHROMIC ANEMIA: Status: ACTIVE | Noted: 2017-01-01

## 2017-02-06 NOTE — PATIENT INSTRUCTIONS
Anemia  Anemia is a condition that occurs when your body does not have enough healthy red blood cells (RBCs). Your RBCs are the parts of your blood that carry oxygen throughout your body.  A protein called hemoglobin allows your RBCs to absorb and release · Complete blood cell count (CBC). This test measures the amounts of the different types of blood cells. · Blood smear. This test checks the size and shape of your blood cells.  To perform the test, your doctor views a drop of your blood under a microscope © 8665-5862 The [address] [place] [address], [address] [place]. All rights reserved. This information is not intended as a substitute for professional medical care. Always follow your healthcare professional's instructions.         Iron-De Follow up with your healthcare provider in 2 months, or as advised. This is to have another red blood cell count to be sure your anemia has been fixed.   When to seek medical advice  Call your healthcare provider right away if any of these occur:  · Gt

## 2017-02-06 NOTE — TELEPHONE ENCOUNTER
Patient notified Dr. Viral Suazo would like her to start Iron supplement today and she can obtain this at her pharmacy from the [place] is. Patient voiced understanding.

## 2017-02-06 NOTE — PROGRESS NOTES
Ivana Leal is a 80year old female.   Patient presents with:  Hospital F/U: VW-- BLOOD TRANSFUSION---- RM 6      HPI:   Pt had weakness  Tests showed hgb 5.7  Profound anemia  Microcytic and hypochromic    She denies dark stools    Poor eating an no me lesions  Less pallor  No jaundice  No diaphosresis  HEENT: atraumatic, normocephalic,ears and throat are clear  NECK: supple,no adenopathy,no bruits  No lad    LUNGS: clear to auscultation  CARDIO: RRR without murmur  GI: good BS's,no masses, HSM or tender MCV 67.1 (L) 81.0-100.0 fL   MCH 17.1 (L) 27.0-33.2 pg   MCHC 25.5 (L) 31.0-37.0 g/dL   RDW 23.7 (H) 11.5-16.0 %   RDW-SD 54.9 (H) 35.1-46.3 fL   Neutrophil Absolute Prelim 5.77 1.30-6.70 x10 (3) uL   Neutrophil Absolute 5.77 1.30-6.70 x10(3) uL   Lympho

## 2017-02-09 NOTE — TELEPHONE ENCOUNTER
Patient notified and voiced understanding. She was given both options of supplements, per Masha Quach [facility] pharmacist , both can be purchased without a prescription.  They have the Ferrous Gluconate in the pharmacy to purchase( patient would have to ask for

## 2017-02-09 NOTE — TELEPHONE ENCOUNTER
Patient took her Iron supplement Monday and Tuesday but then she started to feel nauseous Wednesday and only had a bowel of oatmeal and held the  Iron supplement but she did vomit twice Wednesday going into Thursday,\" a Dark chocolate colored liquid\".  Sh

## 2017-02-09 NOTE — TELEPHONE ENCOUNTER
Lab note as follows:  Labs show that her CBC is 8.1 which is a definite improvement from before. Her reticulocyte count is low as well as her iron. Her erythropoietin level is elevated.   Basically this means that her bone marrow is trying hard to make bl

## 2017-02-10 NOTE — TELEPHONE ENCOUNTER
From: Luann Stephenson  To: Marian Guevara MD  Sent: 2/10/2017 3:34 PM CST  Subject: Non-Urgent Medical Question    Dr. Dela Cruz, I came across this product: Nova Ferrum 125 Liquid Iron Supplement ([email or web address]

## 2017-02-10 NOTE — TELEPHONE ENCOUNTER
From: Cayla Finch  To: Shiela Cushing, MD  Sent: 2/10/2017 3:21 PM CST  Subject: Non-Urgent Medical Question    Dr. Odilia Pearson, I am writing on behalf of my mother Vasiliy Kevin.  As you are are aware she was taking the iron supplements and had a bad reaction to

## 2017-02-10 NOTE — TELEPHONE ENCOUNTER
Pt was requesting clarification on the iron. Advised pt that Dr. Rick Obando recommends the Ferrous Fumarate 324 BID.  Patient notified and verbalized understanding of the information provided

## 2017-02-14 PROBLEM — R19.00 PELVIC MASS IN FEMALE: Status: ACTIVE | Noted: 2017-01-01

## 2017-02-14 NOTE — TELEPHONE ENCOUNTER
Markell Heaton notified and patient schedule at 2 pm. Markell Heaton is having the patient bring along the liquid Iron for Dr. Lenin Blank to review.  Markell Heaton is wondering since the patient is having such problems with this if they can try taking a multivitamin with a high i

## 2017-02-14 NOTE — TELEPHONE ENCOUNTER
pc was made to [facility] after mothers visit and we explained she would go tot the er for eval

## 2017-02-14 NOTE — TELEPHONE ENCOUNTER
From: Cassie Radford  To: Antony Canseco MD  Sent: 2/13/2017 12:04 PM CST  Subject: Test Results Question    Dr Eliud Meadows    This [facility] on behalf of [address] [place] [address]. I see her stool test indicates positive for blood in her stool.  Should she take the liquid

## 2017-02-15 PROBLEM — E46 MALNUTRITION (HCC): Status: ACTIVE | Noted: 2017-01-01

## 2017-02-15 NOTE — DIETARY MALNUTRITION NOTE
[address] ASSESSMENT    Pt is at high nutrition risk. Pt meets malnutrition criteria.     NUTRITION DIAGNOSIS/PROBLEM:    Malnutrition related to decreased ability to consume sufficient energy as evidenced by protruding clavicle, 37.649 kg (83 lb)  08/18/16 : 39.1 kg (86 lb 3.2 oz)  06/22/16 : 39.236 kg (86 lb 8 oz)    NUTRITION:  Diet: NPO  Oral Supplements: None    FOOD/NUTRITION RELATED HISTORY: (prior to admission)  Appetite: Poor  Intake: less than 75%  Intake Meeting Needs: N

## 2017-02-15 NOTE — CONSULTS
[address] Patient Status:  Inpatient    1930 MRN [id number]   [facility] 4NW-A Attending Birgit Najera MD   [address] Day # 1 PCP Sandoval Dela Cruz MD       CHIEF COMPLAINT  Anemia;   Abdominal Diagnosis Date   • Visual impairment    • History of blood transfusion       Reviewed:  History reviewed. No pertinent past surgical history.    Reviewed Social History:    Smoking Status: Former Smoker                   Packs/Day: 0.00  Years:         James 50 02/15/2017   BILT 0.4 02/15/2017   TP 5.7 02/15/2017   AST 6 02/15/2017   ALT 9 02/15/2017   INR 1.08 02/15/2017   CEA - 15.9   - 11.8    Assessment / Plan:  Large colonic mass with obstruction.   Tumor markers - slight elevation of CEA,  wit

## 2017-02-15 NOTE — CONSULTS
[facility]    PATIENT'S NAME: Astrid Lugo   ATTENDING PHYSICIAN: TRICE AsherB[facility] Neurological Institutecompa Beckeris: Katy Smith M.D.    PATIENT ACCOUNT#:   [de-identified]    LOCATION:  [address] [place] [address]  MEDICAL RECORD #:   [id number]       DATE OF BIRTH: she will need surgery.     Dictated By Sergei Pacheco M.D.  d: 02/15/2017 10:13:29  t: 02/15/2017 10:36:18  [facility] [id number]  /

## 2017-02-15 NOTE — H&P
[facility]  History and Physical     Rikki Núñez Patient Status:  Inpatient    1930 MRN [id number]   [facility] 4NW-A Attending Fox Blair MD   Hosp Day #  PCP Marisel Madden MD     Chief Complaint: abnormal labs, p Systems:   A comprehensive 14 point review of systems was completed. Pertinent positives and negatives noted in the HPI. Physical Exam:    There were no vitals taken for this visit. General: No acute distress. Alert and oriented x 3.   HEENT: Normoce MD  2/14/2017

## 2017-02-15 NOTE — PLAN OF CARE
GASTROINTESTINAL - ADULT    • Minimal or absence of nausea and vomiting Progressing    • Maintains or returns to baseline bowel function Progressing    • Maintains adequate nutritional intake (undernourished) Progressing        PAIN - ADULT    • Verbalizes

## 2017-02-15 NOTE — PROGRESS NOTES
[facility]  Progress Note     Osker Lean Patient Status:  Inpatient    1930 MRN [id number]   [facility] 4NW-A Attending Dea Man MD   [facility] Day # 1 PCP Jacque Gamboa MD     Chief Complaint: Abdominal distension    S: Transfuse per heme    Plan of care: As above    Quality:  · DVT Prophylaxis: SCD's  · CODE status: Full code  · Vaughn: None  · Central line: None    Estimated date of discharge: TBD  Discharge is dependent on: Progress  At this point Ms. Justin Cecy is expected

## 2017-02-15 NOTE — CONSULTS
[facility]    Report of Consultation    Cassie Radford Patient Status:  Inpatient    1930 MRN [id number]   [facility] 4NW-A Attending Randall Aschoff, MD   [facility] Day # 1 PCP René Guzman MD     Date of Admission:  2017  Date History   Diagnosis Date   • Visual impairment    • History of blood transfusion      History reviewed. No pertinent past surgical history.   Family History   Problem Relation Age of Onset   • Family history unknown: Yes   Brother  of lung disease, daug CN2-12 intact, strength symmetric, sensory intact. Lymphatics: There is no palpable lymphadenopathy throughout in the cervical,            supraclavicular, axillary, or inguinal regions.    Psych/Depression: Appropriate mood and affect      Laboratory Da recs.  Will need eventual chest CT imaging with contrast    Iron deficiency anemia:  Consider 2 units PRBC to optimize prior to surgical procedures vs. Venofer    Dr. Nadeem Razo will see later today.     [address] [facility] He

## 2017-02-15 NOTE — PATIENT INSTRUCTIONS
Please see better the chart.   Patient transported by neighbor to [facility] emergency room for evaluation of progressing symptoms

## 2017-02-15 NOTE — TELEPHONE ENCOUNTER
Dr. Lenin Blank called and spoke with Markell Heaton and gave her an update on patient's status and plan of care at this point.

## 2017-02-15 NOTE — CONSULTS
[facility]    Gastroenterology Initial Consultation    Murtaza Rojas Patient Status:  Inpatient    1930 MRN [id number]   [facility] 4NW-A Attending Kofi Garcia MD   Hosp Day # 1 PCP Oanh Downs MD       Reason for Consultat MG/ML injection 2 mg, 2 mg, Intravenous, Q2H PRN **OR** morphINE sulfate (PF) 4 MG/ML injection 4 mg, 4 mg, Intravenous, Q2H PRN  •  ondansetron HCl (ZOFRAN) injection 4 mg, 4 mg, Intravenous, Q6H PRN    Review of Systems:    Except for what is noted on th normal conjunctiva  CV: Normal heart sounds, rhythm, peripheral pulses and rate  Respiratory: Clear to auscultation bilaterally  Abdomen: Soft, non-tender, non-guarded, markedly distended, loops of small bowel are visible, hypoactive bowel sounds  Extremit

## 2017-02-15 NOTE — PROGRESS NOTES
Pratik Vo is a 80year old female.   Patient presents with:  Abdominal Pain: RM 6      HPI:   Here for abd pain  2 days  Recent found anemia   Partial correction with 2u prbcs  Heme pos stools    Has o-p appt with gi but not until 2/28    Has never no headaches    EXAM:   /70 mmHg  Pulse 104  Temp(Src) 98.6 °F (37 °C) (Temporal)  Wt 85 lb  SpO2 98% Body mass index is 15.54 kg/(m^2). GENERAL: well developed, thin, uncomfortable, laying on exam table.   Has some abdominal discomfort and is uncom emergency room and that she may be progressing to some sort of unstable condition like a GI bleed. It is not the time to wait for an outpatient evaluation at this time.   Return if symptoms worsen or fail to improve, for after results available from testin

## 2017-02-16 PROBLEM — Z91.81 AT RISK FOR FALLING: Status: ACTIVE | Noted: 2017-01-01

## 2017-02-16 NOTE — PLAN OF CARE
GASTROINTESTINAL - ADULT    • Maintains adequate nutritional intake (undernourished) Not Progressing          GASTROINTESTINAL - ADULT    • Minimal or absence of nausea and vomiting Progressing    • Maintains or returns to baseline bowel function Progressi

## 2017-02-16 NOTE — CONSULTS
[facility]  Report of Pre-op Ostomy Nurse Consultation    Mary Appiah Patient Status:  Inpatient    1930 MRN [id number]   [facility] 4NW-A Attending Mathew Wen DO     History of Present Illness:  Mary Appiah is a a(n) Thank you.     Marlena Newman RN, BSN, [facility]  Wound/Ostomy care pager [phone]  2/16/2017

## 2017-02-16 NOTE — ANESTHESIA PREPROCEDURE EVALUATION
PRE-OP EVALUATION    Patient Name: Padmini Gonzalez    Pre-op Diagnosis: Small bowel obstruction ([facility] [address] [K56.69]    Procedure(s):  exploratory laparotomy, possible right colon or ilesotomy    Surgeon(s) and Role:     * Pedro Hartman MD - Primary    Pre- Medications:    Homeopathic Products (FERRUM HOMACCORD) Oral Liquid Take 125 mg by mouth daily. Taking daily Nova Ferrum 125  Disp:  Rfl: 0   omega-3 fatty acids 1000 MG Oral Cap Take 1,000 mg by mouth nightly.  Disp:  Rfl:    CALCIUM OR Take 1 tablet by mo dentures and lower dentures       Pulmonary      Breath sounds clear to auscultation bilaterally.             (+) coarse breath sounds   Other findings  Appears cachetic  Receiving 2nd unit pRBC today          ASA: 3   Plan: general and epidural  NPO status

## 2017-02-16 NOTE — PLAN OF CARE
GASTROINTESTINAL - ADULT    • Maintains or returns to baseline bowel function Not Progressing    • Maintains adequate nutritional intake (undernourished) Not Progressing          GASTROINTESTINAL - ADULT    • Minimal or absence of nausea and vomiting Progr

## 2017-02-16 NOTE — CM/SW NOTE
SW attempted to meet with patient for discharge planning. Patient currently in surgery. SW to attempt assessment 2/17.

## 2017-02-16 NOTE — PLAN OF CARE
Patient denies nausea, denies pain. Patient's abdomen soft, non-distended, non-tender, bowel sounds hypoactive. Pre- op instructions given, fleets enema given-with good result.  PRBC hung @ 7860. 1720- First unit of PRBC completed, tolerated well, no advers

## 2017-02-16 NOTE — ANESTHESIA POSTPROCEDURE EVALUATION
[address] [place] [address] Patient Status:  Inpatient   Age/Gender 80year old female MRN [id number]   [facility] SURGERY Attending Caty Wei, [address] [facility] Day # 2 PCP Theresa Boswell MD       Anesthesia Post-op Note    Procedure(s):  E

## 2017-02-16 NOTE — BRIEF OP NOTE
[address] [facility]  Brief Op Note     Remigio Finley Location: OR   [facility] [id number] MRN [id number]   Admission Date 2/14/2017 Operation Date 2/16/2017   Attending Physician Claudia Chu DO Operating Physician Ciara Cantu MD       Pre-Operative D

## 2017-02-16 NOTE — CONSULTS
Patient seen and examined at the request of Dr. Taovn Arredondo. Data reviewed; care plan discussed with Dr. Tavon Arredondo and with patient. I also discussed plan with the son, David Bustamante via telephone. Pt with high grade obstruction due to likely right colon mass.     Plan

## 2017-02-16 NOTE — PROGRESS NOTES
[facility]    Progress Note    Annette Trinidad Patient Status:  Inpatient    1930 MRN [id number]   [facility] 4NW-A Attending Parisa Arceo, [address] [facility] Day # 2 PCP Jeromy Ortega MD     Subjective:  Annette Trinidad is a(n) 80 year

## 2017-02-17 NOTE — PROGRESS NOTES
[facility] Pharmacy Note:  Renal Dose Adjustment    Bassam Del Rio has been prescribed famotidine (PEPCID) 20 mg intravenously or orally every 12 hours.     Estimated CrCl 29 ml/min based on weight of 38.6 kg and serum creatinine of 0.85 for age > 70 years    Her

## 2017-02-17 NOTE — DIETARY NOTE
Nutrition Short Note    TPN was initiated last night, but given low urine output, IVF at 100 ml/hour was started and TPN was held. TPN continues to be held, therefore appropriate to order same macronutrients in TPN for tonight.  TPN tonight to provide 510 N

## 2017-02-17 NOTE — CONSULTS
[facility]  Report of Inpatient Ostomy Consultation    Mary Appiah Patient Status:  Inpatient    1930 MRN [id number]   [facility] 3NW-A Attending Mathew Wen DO     History of Present Illness:  Mary Appiah is a a(n) [phone]  2/17/2017

## 2017-02-17 NOTE — PLAN OF CARE
GASTROINTESTINAL - ADULT    • Maintains adequate nutritional intake (undernourished) Not Progressing        GENITOURINARY - ADULT    • Absence of urinary retention Not Progressing          GASTROINTESTINAL - ADULT    • Minimal or absence of nausea and vomi
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
## 2017-02-17 NOTE — CONSULTS
[facility] Urology   Consultation Note    Ivana Leal Patient Status:  Inpatient    1930 MRN [id number]   [facility] 3NW-A Attending Quincymatt Rodriguez, [address] [facility] Day # 3 PCP Neil Hardy MD     Reason for Consultation:  Arianna Selby Allergies    Medications:    Current facility-administered medications:   •  acetaminophen (OFIRMEV) infusion 1,000 mg, 1,000 mg, Intravenous, Q6H  •  HYDROmorphone HCl PF (DILAUDID) 1 MG/ML injection 0.2 mg, 0.2 mg, Intravenous, Q2H PRN **OR** HYDROmorpho 02/17/2017   TP 4.9 02/17/2017   AST 4 02/17/2017   ALT 10 02/17/2017   MG 1.9 02/17/2017   PHOS 2.7 02/17/2017   PGLU 220 02/17/2017       Imaging:       Impression:  Patient Active Problem List:     Microcytic hypochromic anemia     Pelvic mass in female

## 2017-02-17 NOTE — CONSULTS
[facility]  Report of Consultation    Aldair Leyva Patient Status:  Inpatient    1930 MRN [id number]   Location [facility] Attending Ariana Segal, [address] [facility] Day # 2 PCP Osman De Jesus MD     Reason for [facility] ([facility]) drink alcohol or use illicit drugs.     Allergies:  No Known Allergies    Medications:    Current facility-administered medications:   •  dextrose 5 % and 0.9 % NaCl infusion, , Intravenous, Continuous  •  [MAR Hold] dextrose injection 50 mL, 50 mL, Intrave %.    General: Alert, orientated x3. Cooperative. Frail, cachectic, malnourished and ill appearing. HEENT: Exam is unremarkable. Without scleral icterus. Mucous membranes are moist.   Oropharynx is clear. Neck: No tenderness to palpitation. No JVD. and structures. We also discussed the possibile need for further therapeutic, diagnostic, or surgical intervention.   The patient voiced understanding, and after all questions were answered to the patient's satisfaction, the patient provided willing and inf

## 2017-02-17 NOTE — PROGRESS NOTES
[facility]  Progress Note     Thomas Brooks Patient Status:  Inpatient    1930 MRN [id number]   [facility] 4NW-A Attending Eleonora Nuno MD   Hosp Day # 2 PCP Enid Goss MD     Chief Complaint: Abdominal distension    S: 1. Pelvic mass s/p e-lap/right colectomy/small bowel resection/removal of pelvic mass/creation of ileostomy  1. F/u surgical pathology  2. Management per surgery   2. Large bowel obstruction secondary to #1  1. Now with ileostomy   3. Cachexia  1.  BMI

## 2017-02-17 NOTE — PROGRESS NOTES
0330-Patient still with low urine output. Bladder scan showing 126 ml in bladder. Dr Darius Bermeo informed. Orders received to hold TPN for and resume IVFs at 100 ml/hr. Orders implemented. Will continue to monitor. 0510-Urine output still low.  Bladder sc

## 2017-02-17 NOTE — PROGRESS NOTES
[facility]    Progress Note    Zackery Duran Patient Status:  Inpatient    1930 MRN [id number]   [facility] 4[facility]-A Attending Ivan Estrada, [address] [facility] [address] Day # 3 PCP Jonathan Dela Cruz MD     Subjective:  Zackery Duran is a(n) 80 year dose of venofer. Hgb stable. Continue to monitor. Will follow peripherally over the weekend. Call with questions. Martha Cortez M.D.   [facility]  Co-Medical Director, [facility]

## 2017-02-17 NOTE — PROGRESS NOTES
Low urine output noted by writing RN.  notified, further orders received for 500 cc 0.9 NS IV bolus. Orders implemented. Will continue to monitor.

## 2017-02-17 NOTE — OPERATIVE REPORT
[address]    PATIENT'S NAME: Johann Price   ATTENDING PHYSICIAN: Jose Ignacio. Gentry Palma   OPERATING PHYSICIAN: Ria High M.D.    PATIENT ACCOUNT#:   [de-identified]    LOCATION:  [address] [place] [address]  MEDICAL RECORD #:   [id number]       DATE OF BIRTH: apparent complication. Needle, sponge, and instrument counts are correct at the end the procedure.   Please note, preprocedure antibiotic and DVT prophylaxis were administered per protocol, and a time-out was performed prior to initiating the procedure caden intervention. PROCEDURE:  Please note, preprocedure antibiotic and DVT prophylaxis were administered per protocol. A time-out was performed prior to initiating the procedure.       The patient was brought to the operating room, and after the induction o critical stages of this operation and they are protected at all times. The tumor is then  from the sigmoid colon serosa and mesentery. The sigmoid colon is inspected, and there is no evidence of serosal or vascular injury to the sigmoid colon. The main vascular pedicles are then secured using both suture ligation and the LigaSure device. The specimen is removed from the operative field and sent to Pathology for further evaluation. Next, inspection of the small bowel is undertaken.   The small b created in usual fashion, through which the end ileostomy is delivered. Next, the laparotomy incision is reapproximated using running #1 Vicryl suture to reapproximate the peritoneum.   The fascia is then reapproximated using running looped #1 PDS in usual

## 2017-02-17 NOTE — PLAN OF CARE
GENITOURINARY - ADULT    • Absence of urinary retention Not Progressing          GASTROINTESTINAL - ADULT    • Minimal or absence of nausea and vomiting Progressing    • Maintains or returns to baseline bowel function Progressing    • Maintains adequate nu Ramos catheter via Schedulizeford [place] after 2 unsuccessful attempts by writer RN. Patient tolerated fairly well. Reiterated the importance of having ramos catheter while on PCEA. Sensitive to ede area noted.  Minimal urine output noted after insertion with

## 2017-02-17 NOTE — PHYSICAL THERAPY NOTE
Order received, chart reviewed. Patient is currently down in imaging for US. Will attempt to see patient tomorrow if possible.

## 2017-02-17 NOTE — PROGRESS NOTES
Acute Pain Service    Continuous Epidural Infusion Follow-up Note    Indication: Post Surgical.    OBJECTIVE:    Pain Score:    Pt states she has high pain levels with movement.      Side Effects: None    Vital signs:     /77 mmHg  Pulse 70  Temp(S

## 2017-02-17 NOTE — PROGRESS NOTES
[facility]  Progress Note     Valentin Harry Patient Status:  Inpatient    1930 MRN [id number]   [facility] 4NW-A Attending Napoleon Javier MD   Hosp Day # 3 PCP Monique Upton MD     Chief Complaint: Abdominal distension    S: acetaminophen  1,000 mg Intravenous Q6H   • sodium chloride 0.9%  500 mL Intravenous Once   • insulin aspart  1-5 Units Subcutaneous 4 times per day   • Heparin Sodium (Porcine)  5,000 Units Subcutaneous Q8H   • famoTIDine  20 mg Oral Nightly    Or   • fam

## 2017-02-17 NOTE — PROGRESS NOTES
Patient arrived from PACU via bed. Drowsy but easily arousible upon arrival. VSS. Patient assessed and POC discussed with patient and family. Oriented to room. Safety precautions initiated. Bed in low position. Call light in reach.  Will continue to

## 2017-02-17 NOTE — PROGRESS NOTES
[facility]  Progress Note    Barbara Pierre Patient Status:  Inpatient    1930 MRN [id number]   [facility] 3NW-A Attending Ranjan Proctor, [address] [facility] Day # 3 PCP Julia Dela Cruz MD     Subjective:  No new complaints.  No abdominal cordell [I.V.:6438; Blood:350]  Out: 1960 [XWHPV:8070; Emesis/NG output:150; Stool:245; Blood:200]       Assessment  Patient Active Problem List:     Microcytic hypochromic anemia     Pelvic mass in female     Malignant ascites     Small bowel obstruction ([facility] [address] sips of water acceptable. TPN may be resumed; would increase volume in TPN to meet anticipated needs. Increase activity ambulate; GI and DVT prophylaxis. No family present at the time of my visit.   Dr. Darya Garcia to follow the patient through the weekend in

## 2017-02-18 NOTE — PROGRESS NOTES
[facility]  Progress Note    Lana Stringerneri Patient Status:  Inpatient    1930 MRN [id number]   [facility] 3NW-A Attending Jaleesa Sahu, [address] [facility] Day # 4 PCP Shiva Dela Cruz MD     Subjective:  PT up in chair, complaining of disc hypochromic anemia     Pelvic mass in female     Malignant ascites     Small bowel obstruction (HCC)     Anemia     Malnutrition (HCC)     Metabolic acidosis     At risk for falling     Colonic mass     Cachexia (HCC)    POD 2 Exploratory laparotomy.  Right

## 2017-02-18 NOTE — PROGRESS NOTES
[facility]  Progress Note     Camilo Epstein Patient Status:  Inpatient    1930 MRN [id number]   [facility] 4NW-A Attending Ira Hicks MD   Hosp Day # 4 PCP Janet Martinez MD     Chief Complaint: Abdominal distension    S: 20 mg Oral Nightly    Or   • famoTIDine  20 mg Intravenous Nightly       ASSESSMENT / PLAN:     1. Pelvic mass s/p e-lap/right colectomy/small bowel resection/removal of pelvic mass/creation of ileostomy  1. F/u surgical pathology  2.  Continue NGT/NPO/TPN

## 2017-02-18 NOTE — PHYSICAL THERAPY NOTE
PHYSICAL THERAPY EVALUATION - INPATIENT     Room Number: 315/315-A  Evaluation Date: 2/18/2017  Type of Evaluation: Initial  Physician Order: PT Eval and Treat    Presenting Problem: pelvic mass, SBO s/p ex lap R colectomy, small bowel resection, remov functional limits     Lower extremity ROM is within functional limits     Lower extremity strength is within functional limits     BALANCE  Static Sitting: Normal  Dynamic Sitting: Good  Static Standing: Fair +  Dynamic Standing: Fair    ADDITIONAL TESTS suction, with CGA to supervision. Pt was pushing on IV pole. Stand to sit transfer to bedside chair with supervision. Pt educated on AROM exercises while seated on chair. Family in room at this time.  Pt agreeable to sitting up in chair than in bed, perform Goal #4    Goal #5    Goal #6    Goal Comments: Goals established on 2/18/2017

## 2017-02-18 NOTE — PROGRESS NOTES
PATIENT HAS TPN AND IV FLUIDS INFUSING (DUE TO LOW URINE OUTPUT), ON ROOM AIR, NPO EXCEPT ICE CHIPS, MADRIGAL IN PLACE, INCISION WITH MODERATE AMOUNT OF SEROSANGUINOUS DRAINAGE ON DRESSING, NG CLAMPED AND WILL POSSIBLY BE DISCONTINUED AT 1600, ILEOSTOMY WITH

## 2017-02-18 NOTE — PLAN OF CARE
GASTROINTESTINAL - ADULT    • Minimal or absence of nausea and vomiting Progressing    • Maintains or returns to baseline bowel function Progressing    • Maintains adequate nutritional intake (undernourished) Progressing        GENITOURINARY - ADULT    • A

## 2017-02-18 NOTE — PROGRESS NOTES
[facility]  Urology Progress Note    Rikkilinda Núñez Patient Status:  Inpatient    1930 MRN [id number]   [facility] 3NW-A Attending Rand Vilchis, [address] [facility] [address] Day # 4 PCP Zuri Dela Cruz MD     Assessment: Postop day #2 resection of p

## 2017-02-18 NOTE — PLAN OF CARE
PT A/O, LUNGS DIMINISHED, USING IS, SCDS ON, ABDOMEN SOFT, ILEOSTOMY DRAINING SMALL AMOUNT SEROUSANGUINOUS DRAINAGE, NG TO LIS, MADRIGAL DRAINING MINIMUM YELLOW URINE, DRESSING TO MIDLINE STAPLES CHANGED X1, HYPO BS, TPN/.9 INFUSING IN PICC, BLOOD DRAWN FROM

## 2017-02-18 NOTE — PROGRESS NOTES
Acute Pain Service    Patient is 80year old y/o female, post op day 2 expl lap, small bowel resection, removal of pelvic mass with ileostomy creation. If questions or concerns, please page [phone]. Pt assessed sitting in chair. PCEA d/wander yesterday.  Pt sta

## 2017-02-18 NOTE — DIETARY NOTE
Nutrition Short Note    TPN tonight to provide 500 dextrose calories, 275 lipid calories, 75 grams protein in 1500 ml. This is meeting 72% pt nutrition prescription.  Pt at refeeding risk, therefore appropriate to advance cautiously to goal. Electrolytes ba

## 2017-02-19 NOTE — DIETARY NOTE
Nutrition Short Note    TPN tonight advanced to goal. Electrolytes based on am labs. Discussed with surgery PA today. TPN goal: 1200 NPC (825 dextrose calories, 375 lipid calories), 75 grams protein.  This will provide 1500 calories and 75 grams protein -

## 2017-02-19 NOTE — PROGRESS NOTES
Received care of pt at 1500. Vss. Pt a&ox3. Pt on ra with 02 sats wnl. Lungs cta. Pt denies difficulty breathing or sob. Pt denies chest pain. Pt denies n/v. Reports she tolerated clear liquids well for lunch. abd soft, bs present on auscultation.  Pt passi

## 2017-02-19 NOTE — PROGRESS NOTES
[facility]  Progress Note     Dotty Single Patient Status:  Inpatient    1930 MRN [id number]   [facility] 4NW-A Attending Emerson Hauser MD   Hosp Day # 5 PCP Tabatha Fitzpatrick MD     Chief Complaint: Abdominal distension    S: • famoTIDine  20 mg Oral Nightly    Or   • famoTIDine  20 mg Intravenous Nightly       ASSESSMENT / PLAN:     1. Pelvic mass s/p e-lap/right colectomy/small bowel resection/removal of pelvic mass/creation of ileostomy  1. Pending surgical pathology  2.  C

## 2017-02-19 NOTE — PLAN OF CARE
Problem: Patient/Family Goals  Goal: Patient/Family Short Term Goal  Patient’s Short Term Goal: PREPARE FOR DISCHARGE HOME    Interventions:   - ADVANCE DIET AS TOLERATED  -TRANSITION IV TO ORAL PAIN MEDICATION  -ENCOURAGE AMBULATION  - See additional Care emptying   Outcome: Progressing  Vaughn draining clear yellow urine. Dr Friedman Cones here to see pt.   Will do us of kidneys after blood transfusion completed    Problem: SKIN/TISSUE INTEGRITY - ADULT  Goal: Incision(s), wounds(s) or drain site(s) healing without S

## 2017-02-19 NOTE — PROGRESS NOTES
[facility]  Progress Note    Luis Enrique Ornelas Patient Status:  Inpatient    1930 MRN [id number]   [facility] 3NW-A Attending Valentina Quintero, [address] [facility] Day # 5 PCP Reynold Dela Cruz MD     Subjective:  Pt resting in bed, no complaints Microcytic hypochromic anemia     Pelvic mass in female     Malignant ascites     Small bowel obstruction (HCC)     Anemia     Malnutrition (HCC)     Metabolic acidosis     At risk for falling     Colonic mass     Cachexia (HCC)    POD 3 Exploratory laparo

## 2017-02-19 NOTE — PROGRESS NOTES
[facility]  Urology Progress Note    Tod Vang Patient Status:  Inpatient    1930 MRN [id number]   [facility] 3NW-A Attending Iveth Lamar, [address] [facility] [facility] [address] Day # 5 PCP Donato Dela Cruz MD     Assessment: Postop day #3 resection of p

## 2017-02-19 NOTE — PROGRESS NOTES
Anesthesiology Pain Service         Pt is s/p Exp Lap 2/16/17 with PCEA for postop analgesia. As early as 2/17, there is a Pain Service note describing leakage of fluid at the site of the epidural with pooling under the dressing.   The epidural was removed

## 2017-02-19 NOTE — PROGRESS NOTES
Acute Pain Service    POD 3: expl lap, small bowel resection, removal of pelvic mass with ileostomy creation    Pt states she is comfortable at rest; reports pain with movement. Pt is receiving Ofirmev and dilaudid IVP for pain mgt. Pt remains NPO.

## 2017-02-20 NOTE — PROGRESS NOTES
[facility]  Progress Note    Honor Must Patient Status:  Inpatient    1930 MRN [id number]   [facility] 3NW-A Attending Mary Jane Morales, [address] [facility] Day # 6 PCP Niecy Dela Cruz MD     Subjective:  Pt sitting in chair, no complaints [Stool:100]    Assessment  Patient Active Problem List:     Microcytic hypochromic anemia     Pelvic mass in female     Malignant ascites     Small bowel obstruction (HCC)     Anemia     Malnutrition (HCC)     Metabolic acidosis     At risk for falling

## 2017-02-20 NOTE — PROGRESS NOTES
[facility]  Urology Progress Note    Molinda Loop Patient Status:  Inpatient    1930 MRN [id number]   [facility] 3NW-A Attending Ariana Segal, [address] [facility] Day # 6 PCP Brissa Dela Cruz MD     Subjective:  Molinda Loop is a(n) 80 Sludge in the gallbladder along with pericholecystic fluid and small amount of free fluid is noted. Please correlate for right upper quadrant pain.          Assessment:    POD #4 Exploratory laparotomy. Right colectomy. Small bowel resection.  Removal of pe

## 2017-02-20 NOTE — WOUND PROGRESS NOTE
[facility]  Inpatient Ostomy Progress Note    Ubaldo Lubin Patient Status:  Inpatient    1930 MRN [id number]   [facility] 3NW-A Attending Gala Holt DO   Days in hospital 6 Primary Navjot Dela Cruz MD     History of Present [place] participate in the care of your patient.     Marlena Newman RN, BSN, [facility] nurse  Pager [phone]

## 2017-02-20 NOTE — PROGRESS NOTES
Vaughn d/c'd at this time. Pt tolerated well. Voiding expectations explained and pt shows understanding. Will continue to monitor.

## 2017-02-20 NOTE — DIETARY NOTE
[address] [facility] ASSESSMENT    Pt is at high nutrition risk. Pt meets malnutrition criteria.     NUTRITION DIAGNOSIS/PROBLEM:    Malnutrition related to decreased ability to consume sufficient energy as evidenced by protruding clavicle Readings from Last 6 Encounters:  02/20/17 : 46.085 kg (101 lb 9.6 oz)  02/14/17 : 38.556 kg (85 lb)  02/06/17 : 38.556 kg (85 lb)  01/30/17 : 37.649 kg (83 lb)  08/18/16 : 39.1 kg (86 lb 3.2 oz)  06/22/16 : 39.236 kg (86 lb 8 oz)    NUTRITION:  Diet: Full

## 2017-02-20 NOTE — PROGRESS NOTES
bp elevated at 161/61. Pt c/o abdominal pain after getting back to bed. abd soft. Midline gauze dressing c/d/i. Ileostomy draining liquid brown stool in good amounts. Dilaudid given for pain with good relief. Will recheck bp in 1 hr.  Will continue to monit

## 2017-02-20 NOTE — PHYSICAL THERAPY NOTE
PHYSICAL THERAPY TREATMENT NOTE - INPATIENT    Room Number: 125/221-M     Session: 1   Number of Visits to Meet Established Goals: 5    Presenting Problem: pelvic mass, SBO s/p ex lap R colectomy, small bowel resection, removal pelvic mass w ileostomy Score: 18   PT Approx Degree of Impairment Score: 46.58%   Standardized Score (AM-PAC Scale): 43.63   CMS Modifier (G-Code): CK    FUNCTIONAL ABILITY STATUS  Gait Assessment   Gait Assistance: Dependent assistance  Distance (ft): 30ft  Assistive Device: Ro (ELOS 9-11 days)     PLAN  PT Treatment Plan: Bed mobility; Endurance; Energy conservation;Patient education; Family education;Gait training;Strengthening;Transfer training;Balance training  Rehab Potential : Good  Frequency (Obs): 5x/week    CURRENT GOALS

## 2017-02-20 NOTE — PROGRESS NOTES
Andra Beck, [address] for Dr. Catrina Valdez on floor and stated ok to remove ramos catheter from surgery standpoint. Radha SALOMON for urology paged and stated ok to remove ramos as well. Radha stated to check PVR with first void. Pt updated on poc and shows understanding.  Will

## 2017-02-20 NOTE — PROGRESS NOTES
Pain Service  Assessed epidural site - no erythema or drainage from site. No further recommendations.   Will sign off  Walter Cadet RN

## 2017-02-20 NOTE — CM/SW NOTE
MSW spoke to patient and her son about d/c needs. Patient and son agreeable that SAGAR maybe needed. List of SAGAR given to son, and he was asked to pick 2 choices for swk. He states his sister will tour.   QD

## 2017-02-20 NOTE — PROGRESS NOTES
[facility]  Progress Note     Zackery Duran Patient Status:  Inpatient    1930 MRN [id number]   [facility] 4NW-A Attending Sylvia Ashley MD   Hosp Day # 6 PCP Lilia Mercado MD     Chief Complaint: Abdominal distension    S: Units Subcutaneous Q8H   • famoTIDine  20 mg Oral Nightly    Or   • famoTIDine  20 mg Intravenous Nightly       ASSESSMENT / PLAN:     1. Pelvic mass s/p e-lap/right colectomy/small bowel resection/removal of pelvic mass/creation of ileostomy  1.  Pending s

## 2017-02-21 NOTE — CM/SW NOTE
MSW spoke to family who was present and they are in the process of completing tours today. Will call MSW before end of day with 1st & 2nd choice for SAGAR.

## 2017-02-21 NOTE — PROGRESS NOTES
Pt voided 300ml of urine at this time after ramos removal. PVR completed and was 75ml. Pt denies feelings of retention. Will continue to monitor.

## 2017-02-21 NOTE — PROGRESS NOTES
Patient scheduled for ultrasound doppler at 1330. Patient also has meeting scheduled with Dr. Rosendo Ruth at [phone].   This RN told ultrasound patient HAS to be on the floor by 1430 for her meeting with the MD and ultrasound tech assured me she would be return

## 2017-02-21 NOTE — DIETARY NOTE
Nutrition Short Note    TPN tonight remains at goal. Pt with poor appetite may need to consider to decreasing TPN in the future.     Electrolytes based on am labs, Potassium omitted from tonight's TPN d/t elevated level and may be added again slowly to rubens

## 2017-02-21 NOTE — PROGRESS NOTES
[facility]  Urology Progress Note    Molinda Loop Patient Status:  Inpatient    1930 MRN [id number]   [facility] 3NW-A Attending Ariana Segal, [address] [facility] Day # 7 PCP Brissa Dela Cruz MD     Subjective:  Molinda Loop is a(n) 80

## 2017-02-21 NOTE — PROGRESS NOTES
Patient up and ambulated in hallway, tolerated fairly well. Up to chair at present. Family at bedside.

## 2017-02-21 NOTE — CONSULTS
[facility]  Inpatient Ostomy Progress Note    Cassie Radford Patient Status:  Inpatient    1930 MRN [id number]   [facility] 3NW-A Attending Lazarus Handy, DO   Days in hospital 7 Primary René Guzman MD     History of Present [place] [address] [facility] nurse  Pager [phone]

## 2017-02-21 NOTE — PROGRESS NOTES
Family concerned regarding redness to patients right eye.  patietn denies any sort of discomfort or irritation to this eye. Conjunctiva appears red. Patient denies any blurred or loss of vision.   Dr. Kevin Flores notified regarding this and also ultrasound re

## 2017-02-21 NOTE — PLAN OF CARE
GASTROINTESTINAL - ADULT    • Minimal or absence of nausea and vomiting Progressing    • Maintains or returns to baseline bowel function Progressing        GENITOURINARY - ADULT    • Absence of urinary retention Progressing        PAIN - ADULT    • Aiden Tyler

## 2017-02-21 NOTE — PROGRESS NOTES
[facility]  Progress Note    Ivana Leal Patient Status:  Inpatient    1930 MRN [id number]   [facility] 3NW-A Attending Quincy Rodriguez, [address] [facility] [address] Day # 7 PCP Bebo Dela Cruz MD     Subjective:   The patient feels well with adequat anemia     Pelvic mass in female     Malignant ascites     Small bowel obstruction (HCC)     Anemia     Malnutrition (HCC)     Metabolic acidosis     At risk for falling     Colonic mass     Cachexia ([facility] [address]      POD # 5 s/p laparotomy, Right colectomy, SBR,

## 2017-02-21 NOTE — PROGRESS NOTES
[facility]  Progress Note     Radha Martins Patient Status:  Inpatient    1930 MRN [id number]   [facility] 4NW-A Attending Monica Reyes MD   Hosp Day # 7 PCP Adali Morales MD     Chief Complaint: Abdominal distension    S: famoTIDine  20 mg Oral Nightly    Or   • famoTIDine  20 mg Intravenous Nightly       ASSESSMENT / PLAN:     1. Colon adenocarcinoma  1. Path noted  2. CT chest negative  3. Onc to discuss treatment with family  2.  Pelvic mass s/p e-lap/right colectomy/smal

## 2017-02-22 NOTE — CM/SW NOTE
MSW spoke to family. 1st choice is [facility] at [facility] pending. Family would then like the [facility]-referral sent via [facility] [address] 2/22 @ 11:13am. 2nd choice is [address] [place] [address].

## 2017-02-22 NOTE — PROGRESS NOTES
[facility]  Progress Note    Honor Must Patient Status:  Inpatient    1930 MRN [id number]   [facility] 3NW-A Attending Mary Jane Morales, [address] [facility] Day # 8 PCP Niecy Dela Cruz MD     Subjective:   The patient just returned from [address] [place] [place], Metabolic acidosis     At risk for falling     Colonic mass     Cachexia (HCC)      POD # 6 s/p laparotomy, Right colectomy, SBR, and excision of pelvic mass. Pathology reviewed-- Stage IIIc colon cancer of cecum;  Cystic lesion of pelvic organs without colectomy small bowel resection and excision of complex pelvic mass. 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   · Oncologic management per Dr. Karley Larry; the patient is contemplating 5-FU only ve

## 2017-02-22 NOTE — PROGRESS NOTES
[facility]    Progress Note    Thomas Brooks Patient Status:  Inpatient    1930 MRN [id number]   [facility] 3NW-A Attending Stephen Villeda, [address] Day # 8 PCP Kera Dela Cruz MD     Subjective:  Thomas Brooks is a(n) 86 year ([address] [facility] Kylah Jaeger 35 ([address]) which includes the    Dose Index Registry.      PATIENT STATED HISTORY:  Patient has history of colon cancer.       CONTRAST USED:  75 cc of Omnipaque 350                  FINDINGS:    embolus. 2. Moderate-sized posterior pleural effusions slightly larger on the right. There is some loculation medially on the left.  There is also pleural calcified plaque and noncalcified plaque noted with some enhancement of the pleura posteriorly bilate detected on CT of the chest.  Surgery will evaluate. The CT of the chest showed no metastases. She has bilateral effusions, but I reviewed her CXR from [facility] and her upright film done during her abdominal series.   No effusions were present aosergo perera

## 2017-02-22 NOTE — CONSULTS
[address] [place] [address] Box [facility] Patient Status:  Inpatient    1930 MRN [id number]   [facility] 3NW-A Attending Crystal Solis, [address] [facility] Day # 8 PCP Nathan Howard MD     Patient Identification  Megan Level is a 80year old femal the CT scan.  She is on TPN.   Oral intake seems minimal at this time.     Seen by Oncology. Possible CTX at a later date. Physiatry consult obtained now to assess pt's funtional status and make appropriate recommendations.     Premorbid Functional Statu infusion 1,000 mg 1,000 mg Intravenous Q6H   HYDROmorphone HCl PF (DILAUDID) 1 MG/ML injection 0.2 mg 0.2 mg Intravenous Q2H PRN   Or      HYDROmorphone HCl PF (DILAUDID) 1 MG/ML injection 0.4 mg 0.4 mg Intravenous Q2H PRN   [] adult 3 in 1 tpn  Int mg 0.4 mg Intravenous Q2H PRN   [DISCONTINUED] Bupivacaine HCl (PF) (MARCAINE) 0.1 % in sodium chloride 0.9 % 250 mL epidural  Epidural Continuous   [DISCONTINUED] lactated ringers infusion  Intravenous Continuous   [DISCONTINUED] HYDROmorphone HCl PF (DIL Allergies:  No Known Allergies          Lab Results  Component Value Date   CREATSERUM 1.00 02/22/2017   BUN 33 02/22/2017    02/22/2017   K 4.6 02/22/2017    02/22/2017   CO2 25.0 02/22/2017    02/22/2017   CA 7.8 02/22/2017   ALB CNII-XII are grossly intact. Sensation to dull touch intact in all extremities and reflexes are 2+, bilateral and symmetric for biceps, brachioradialis, triceps, patella and achilles throughout. Babinski negative bilaterally.   Latif's negative bilaterall

## 2017-02-22 NOTE — PHYSICAL THERAPY NOTE
Attempted to see patient in AM. Checked with patient's nurse, RN mentioned that patient is going down for a chest CT soon. Will check back in afternoon as time permits.

## 2017-02-22 NOTE — PHYSICAL THERAPY NOTE
Checked with RN prior to seeing patient. RN stated that patient is currently not feeling well and would prefer that we hold off on seeing patient until tomorrow. Will attempt to see patient tomorrow as time permits.

## 2017-02-22 NOTE — PROGRESS NOTES
[facility]  Progress Note     Elizabeth Fleming Patient Status:  Inpatient    1930 MRN [id number]   [facility] 4NW-A Attending Brittany Godoy MD   Hosp Day # 8 PCP Gab Fitzgerald MD     Chief Complaint: Abdominal distension    S: Or   • famoTIDine  20 mg Intravenous Nightly       ASSESSMENT / PLAN:     1. Colon adenocarcinoma  1. Path noted  2. CT chest result pending   3. Onc to discuss treatment with family  2.  Pelvic mass s/p e-lap/right colectomy/small bowel resection/removal o

## 2017-02-22 NOTE — DIETARY NOTE
Nutrition Short Note    TPN tonight remains at goal. Pt able to tolerate sushi and miso soup for dinner, planning to order something from low residue menu. Electrolytes based on am labs. All discussed with surgery PA today.     TPN goal: 1200 NPC (825 dextr

## 2017-02-22 NOTE — PLAN OF CARE
GASTROINTESTINAL - ADULT    • Minimal or absence of nausea and vomiting Progressing        GENITOURINARY - ADULT    • Absence of urinary retention Progressing        Patient/Family Goals    • Patient/Family Long Term Goal Progressing        PT RESTING IN B

## 2017-02-23 NOTE — CM/SW NOTE
MSW spoke to patient's dtr and son about d/c planning. They have decided to cancel [facility] and go to the [facility]. MSW called [facility] and let them know they are final choice.

## 2017-02-23 NOTE — DIETARY NOTE
Nutrition Short Note    TPN reordered for tonight. Pt continues to have very minimal intake by mouth, states she is not hungry for anything, did not order anything for dinner last night or breakfast this morning.   Receptive to cream of wheat and ensure enl

## 2017-02-23 NOTE — PROGRESS NOTES
[facility]  Progress Note    Camilo Epstein Patient Status:  Inpatient    1930 MRN [id number]   [facility] 3NW-A Attending Elsy Fish, [address] Day # 9 PCP Sarah Schofieldutant MD Lanie     Subjective:  Paged regarding small amount of gerardo Problem List:     Microcytic hypochromic anemia     Pelvic mass in female     Malignant ascites     Small bowel obstruction (HCC)     Anemia     Malnutrition (HCC)     Metabolic acidosis     At risk for falling     Colonic mass     Cachexia ([facility]     Naila Josue

## 2017-02-23 NOTE — CM/SW NOTE
MSW spoke to son about post d/c placement. MSW discussed that [facility] can not promise beds anytime soon, Family needs to decide to either go to [place] [address] @ The [address] [place] [address] or have MSW send [address] [place] [address] referrals to other [address] [place] [address].      Son states he will f/u with MSW after lunch

## 2017-02-23 NOTE — PROGRESS NOTES
[facility]  Progress Note     Niel Klinefelter Patient Status:  Inpatient    1930 MRN [id number]   [facility] 4NW-A Attending Cathi Ingram MD   Hosp Day # 9 PCP Lyla Castaneda MD     Chief Complaint: Abdominal distension    S: Intravenous Nightly       ASSESSMENT / PLAN:     1. Colon adenocarcinoma  1. Path noted  2. CT chest result pending   3. Pending decision regarding adjuvant chemo   2.  Pelvic mass s/p e-lap/right colectomy/small bowel resection/removal of pelvic mass/creat

## 2017-02-24 NOTE — PROGRESS NOTES
Report given to nurse stark at the [facility]. D/c instructions given to pt & pt's dtr. rx for norco given to pt's dtr @ this time.

## 2017-02-24 NOTE — PROGRESS NOTES
Pt d/c to the [facility]. D/c instructions reviewed with pt & dtr, including d/c meds & rx for norco, diet, hydration, activity, wound care, ostomy care & f/u care. Verbalized understanding. Left unit stable via w/c.

## 2017-02-24 NOTE — CM/SW NOTE
Patient and family updated on d/c today. Patient may transport by family car. No TPN per Surgery PA. AVS sent via XING. [address] ready for patient at 1pm.   Family present in room.

## 2017-02-24 NOTE — PROGRESS NOTES
Jolanta Abda pa here & states to d/c tpn upon d/c.   Also states pt does not need tpn upon discharge

## 2017-02-24 NOTE — PHYSICAL THERAPY NOTE
PHYSICAL THERAPY TREATMENT NOTE - INPATIENT    Room Number: 315/315-A     Session: 2  Number of Visits to Meet Established Goals: 5    Presenting Problem: pelvic mass, SBO s/p ex lap R colectomy, small bowel resection, removal pelvic mass w ileostomy    P Moving to and from a bed to a chair (including a wheelchair)?: None   -   Need to walk in hospital room?: A Little   -   Climbing 3-5 steps with a railing?: A Lot       AM-PAC Score:  Raw Score: 20   PT Approx Degree of Impairment Score: 35.83%   Standardi independence, and will benefit from continued skilled PT during patient's IP stay. Anticipate gait training sans AD next session.      DISCHARGE RECOMMENDATIONS  PT Discharge Recommendations: Acute rehabilitation     PLAN  PT Treatment Plan: Bed mobility;E

## 2017-02-24 NOTE — PLAN OF CARE
Pt a&o, vitals stable. On RA, IS utilized and continued use encouraged. Ileostomy intact with watery brown output, flatus noted. Abdominal incision with staples c/d/i. Pt denies pain, scheduled IV tylenol administered. Pt voids without difficulty.  TPN infu

## 2017-02-24 NOTE — PROGRESS NOTES
deborah ramirez paged to ask if pt will need picc line upon d/c or if picc should be taken out prior to discharge

## 2017-02-24 NOTE — PROGRESS NOTES
[facility]  Progress Note     Mara Lee Patient Status:  Inpatient    1930 MRN [id number]   [facility] 4NW-A Attending Kim Finney MD   Hosp Day # 10 PCP Halima Ruth MD     Chief Complaint: Abdominal distension    S: ASSESSMENT / PLAN:     1. Colon adenocarcinoma  1. Path noted  2. CT chest negative for mets  3. Pending decision regarding adjuvant chemo   2.  Pelvic mass s/p e-lap/right colectomy/small bowel resection/removal of pelvic mass/creation of ileostomy

## 2017-02-24 NOTE — PROGRESS NOTES
[facility]  Progress Note    Molinda Loop Patient Status:  Inpatient    1930 MRN [id number]   [facility] 3NW-A Attending Ariana Segal, [address] [facility] Day # 10 PCP Brissa Dela Cruz MD     Subjective:  Pt up ambulating with PT.  Reports Colonic mass     Cachexia (HCC)     Malignant neoplasm of ascending colon (HCC)     Pleural effusion, bilateral     Pleural effusion     Pneumoperitoneum      POD # 8 s/p laparotomy, Right colectomy, SBR, and excision of pelvic mass.     Pathology reviewed-

## 2017-02-24 NOTE — DISCHARGE SUMMARY
[facility]  DISCHARGE SUMMARY     Yosi Meier Patient Status:  Inpatient    1930 MRN [id number]   [facility] 3NW-A Attending No att. providers found   [facility] Day # 10 PCP Malik Estes MD     Date of Admission: 2017  Ced obstruction from pelvic/colon mass. General surgery consulted. Patient underwent surgical intervention with resection of pelvic/colon mass and creation of ileostomy. Pelvic mass found to be cystic in nature. Colon mass positive for adenocarcinoma.  Patient Take 1,000 mg by mouth nightly.     Refills:  0            Where to Get Your Medications      Please  your prescriptions at the location directed by your doctor or nurse     Bring a paper prescription for each of these medications    - HYDROcodone

## 2017-02-24 NOTE — PLAN OF CARE
Problem: Patient/Family Goals  Goal: Patient/Family Short Term Goal  Patient’s Short Term Goal: PREPARE FOR DISCHARGE HOME    Interventions:   - ADVANCE DIET AS TOLERATED  -TRANSITION IV TO ORAL PAIN MEDICATION  -ENCOURAGE AMBULATION  - See additional Care urinary retention protocol/standard of care  - Consider collaborating with pharmacy to review patient’s medication profile  - Implement strategies to promote bladder emptying   Outcome: Adequate for Discharge  Voiding w/out difficulty    Problem: SKIN/TISS

## 2017-02-24 NOTE — CM/SW NOTE
02/24/17 1500   Discharge disposition   Discharged to: Skilled Nurs   Name of [address] [facility]   Patient is Discharged to a [address] [place] Yes   Discharge transportation Private car

## 2017-02-24 NOTE — PROGRESS NOTES
RT ARM PICLINE DC'D PER ORDER , NO S/S OF INFECTION NOTED . PROTOCOL MAINTAINED , DRESSING APPLIED . TIP INTACT . NO BLEEDING NOTED .  SITE DRY AND INTACT , UPDATED WITH Pt AND FAMILY

## 2017-02-24 NOTE — PROGRESS NOTES
[facility]    Progress Note    Diego Ozuna Patient Status:  Inpatient    1930 MRN [id number]   [facility] 3NW-A Attending Caty Wei, [address] [facility] Day # 10 PCP Vicenta Dela Cruz MD     Subjective:  Diego Ozuna is a(n) 80 yea Registry) which includes the    Dose Index Registry.      PATIENT STATED HISTORY:  Patient has history of colon cancer.       CONTRAST USED:  75 cc of Omnipaque 350                  FINDINGS:     LUNGS:  There is bilateral posterior lung passive atelectasi larger on the right. There is some loculation medially on the left.  There is also pleural calcified plaque and noncalcified plaque noted with some enhancement of the pleura posteriorly bilaterally.    This enhancement may be related to chronic change relat follow up in 2-3 weeks for repeat CXR and to arrange chemotherapy. Malnutrition:  TPN, push oral calories as tolerated    Debility:  Rehab    Dispo:  Plan for rehab after discharge. I will sign off and follow peripherally.  Please call with any que

## 2017-02-27 NOTE — PROGRESS NOTES
Patient is an 49-year-old female admitted to the [facility] from [facility] on Friday, February 24. She been admitted to the hospital from an outside hospital due to abnormal labs. She was anemic with a hemoglobin of 5.7.   She also was noted to have a dehiscence. Extremities: No cyanosis, she has trace edema noted in both lower extremities from the ankles up to the mid pretibial area. Neuro: Grossly nonfocal    Impression: 1. Colonic adenocarcinoma, 2.   Pelvic mass consistent with benign cystadenoma,

## 2017-02-28 NOTE — TELEPHONE ENCOUNTER
Order pended for [facility] to at least get them in to see and teach patient about her new colostomy. However, if Louis Hilliard is wanting 24 hrs care during Chemotherapy, the best option would be the nursing home. Left message to discuss.

## 2017-02-28 NOTE — TELEPHONE ENCOUNTER
Christopher Castaneda, is wondering with the new colostomy and the fact that the patient will be in a weakened state during Chemo, if this will justify the need for home health?  Also, asking which home health agencies we use, I recommended [facility], [facility]

## 2017-03-01 NOTE — TELEPHONE ENCOUNTER
Per Elyssa Pierce, patient dropped 20 lbs over the weekend and is still requiring TPN therapy. They are not sure when she will be discharged.  I did explain to her the difference between Home health care once or twice a week for an hour vs. 24 hrs care in the [facility]

## 2017-03-01 NOTE — PROGRESS NOTES
Patient was seen in follow-up for her recent right colectomy, removal of the pelvic mass and small bowel resection. The patient continues to have a really poor appetite.   She is able to swallow pills and drink liquids but she does not want to really eat m Lomotil for the patient and to increase fiber in her diet. The patient's PICC line was placed this morning we will start TPN later on this evening and cycle it at night. She does have an appointment with the surgeon tomorrow.   Her discharge date has been

## 2017-03-02 NOTE — PROGRESS NOTES
Follow Up Visit Note       Active Problems      1. Malignant neoplasm of ascending colon ([address]    2. Pelvic mass in female    3.  Malnutrition [facility])          Chief Complaint   Patient presents with:  Post-Op: 1st po exploratory laparotomy,right colectomy, s patient's sacrococcygeal region as well as for management strategies for her ileostomy and appliances thereof. The patient is meeting with her medical oncologist to discuss benefits and alternatives of chemotherapy.   The patient denies fever, chills, ches Systems has been reviewed by me during today. Review of Systems   Constitutional: Negative for fever, chills, diaphoresis, fatigue and unexpected weight change. HENT: Negative for hearing loss, nosebleeds, sore throat and trouble swallowing.     Respirat tenderness at McBurney's point and negative Butler's sign. Hernia confirmed negative in the ventral area, confirmed negative in the right inguinal area and confirmed negative in the left inguinal area. Musculoskeletal: Normal range of motion.    Lymph recommended follow-up with the [facility] wound care center to discuss ileostomy management and decubitus ulcer prevention strategies. ·   · The patient will return to my attention in 4 weeks or sooner should the need arise.   ·   · The patient is enc

## 2017-03-04 PROBLEM — E03.9 HYPOTHYROIDISM, UNSPECIFIED TYPE: Status: ACTIVE | Noted: 2017-01-01

## 2017-03-04 PROBLEM — R74.01 TRANSAMINITIS: Status: ACTIVE | Noted: 2017-01-01

## 2017-03-04 PROBLEM — E86.0 DEHYDRATION: Status: ACTIVE | Noted: 2017-01-01

## 2017-03-04 PROBLEM — N17.9 AKI (ACUTE KIDNEY INJURY) (HCC): Status: ACTIVE | Noted: 2017-01-01

## 2017-03-05 NOTE — CONSULTS
[facility]  Report of Consultation    Tricia Martinez Patient Status:  Inpatient    1930 MRN [id number]   [facility] 4NW-A Attending Emily Leiva MD   Hosp Day # 1 PCP Jaylyn Ricketts MD     Reason for Consultation:  High ileostom acids 1000 MG Oral Cap  Take 1,000 mg by mouth nightly. CALCIUM OR  Take 1 tablet by mouth 2 (two) times daily. Multiple Vitamins-Minerals (CENTRUM SILVER) Oral Tab  Take 1 tablet by mouth daily.           Review of Systems:    Allergic/Immuno:  Kinnilexi Aranda productive. Incision:  Clean, dry, intact. Extremities:  No lower extremity edema noted. Without clubbing or cyanosis. Skin: Normal texture and turgor. Lymphatic:  No palpable cervical lymphadenopathy. Neurologic: Cranial nerves are grossly intact. patient. More than 50% of that time was spent counseling the patient and/or on coordination of care. The diagnosis, prognosis, and general treatment was explained to the patient and the family.     Rebeka Ochoa MD  3/5/2017  11:41 AM

## 2017-03-05 NOTE — PLAN OF CARE
Problem: Impaired Swallowing  Goal: Minimize aspiration risk  Interventions:  - Patient should be alert and upright for all feedings (90 degrees preferred)  - Offer thin liquids  - No straws  - Encourage small sips of liquid    - Discontinue feeding and no

## 2017-03-05 NOTE — H&P
[facility]  History and Physical     Lemond Divine Patient Status:  Emergency    1930 MRN [id number]   Location [address] [facility] Attending Camilla Dumont MD   Hosp Day # 0 PCP Diomedes Pal MD     Chief Complaint: lo CALCIUM OR Take 1 tablet by mouth 2 (two) times daily. Disp:  Rfl:    Multiple Vitamins-Minerals (CENTRUM SILVER) Oral Tab Take 1 tablet by mouth daily. Disp:  Rfl:        Review of Systems:   A comprehensive 14 point review of systems was completed. dehydration, IVF  5. Hypokalemia  1. Replace per protocol  6. Colon Cancer s/p bowel resection  1. Surgery to eval given recent ileostomy placement  2. Likely will need TPN again  7. Anemia  8. Dysphagia  1.  Was scheduled to have esophagram on 3/6, will tr

## 2017-03-05 NOTE — PROGRESS NOTES
[facility]  Progress Note     Thomas Brooks Patient Status:  Inpatient    1930 MRN [id number]   [facility] 4NW-A Attending Ivania Andre MD   Hosp Day # 1 PCP Enid Goss MD     Chief Complaint: SAEID    S: Patient reports o tablet Oral Daily   • omega-3 fatty acids  1,000 mg Oral Nightly   • Heparin Sodium (Porcine)  5,000 Units Subcutaneous Q8H       ASSESSMENT / PLAN:     1. Acute Kidney Injury- improving , due to dehydration   1. Continue IVF  2. Monitor BMP  2.  Diarrhea

## 2017-03-05 NOTE — ED INITIAL ASSESSMENT (HPI)
Pt presents from [facility] [facility] with report of 25 lb weight loss in last week. Pt had SBO in Feb. Had ostomy placed. Reports eating and \"it's all going right though me\". Stool is liquid and brown. Denies nausea, vomiting, or fevers.  Sent here

## 2017-03-05 NOTE — ED PROVIDER NOTES
Patient Seen in: [facility] Emergency Department    History   Patient presents with:  Poor Feed Anorexia (constitutional)    Stated Complaint:     HPI    Patient is a 49-year-old female who had a right hemicolectomy and partial small bowel resection Smokeless Status: Never Used                        Alcohol Use: No                Review of Systems    Positive for stated complaint:   Other systems are as noted in HPI. Constitutional and vital signs reviewed.       All other systems reviewed and negati orders were created for panel order CBC WITH DIFFERENTIAL WITH PLATELET.   Procedure                               Abnormality         Status                     ---------                               -----------         ------                     CBC W/ D STOOL - Normal   CBC WITH DIFFERENTIAL WITH PLATELET    Narrative: The following orders were created for panel order CBC WITH DIFFERENTIAL WITH PLATELET.   Procedure                               Abnormality         Status                     --------- CT of the abdomen and pelvis recently performed for further details.     Dictated by: Valeria Menendez MD on 2/15/2017 at 12:00     Approved by: Valeria Menendez MD            Xr Ivp + Nephro (XLB=[id number])    2/21/2017  PROCEDURE:  INTRAVENOUS PYELOGRAM (IVP)  TECHNIQUE: 13:28. External Exams, CT ABDOMEN+PELVIS(CPT=74176), 2/14/2017, 16:12. External Exams, XR CHEST PA + LAT CHEST (CPT=71020), 2/14/2017, 16:38. INDICATIONS:  Colon cancer R/O mets.   TECHNIQUE:  CT images were obtained with non-ionic intravenous contrast m enhancement of the liver. This was not identified on the study of 0/86/03 and is of uncertain significance. BONES:  Degenerative disc disease without fracture. OTHER:  There are small 4 mm nodules within the right and left lobes of the thyroid.       2/22/2 CYSTS/STONES/MASSES:  None. BLADDER:  The bladder is decompressed by Vaughn catheter. Bladder volume is 115 cc. Smaller free fluid is noted. OTHER:  Sludge in the gallbladder with pericholecystic fluid is noted. Mild wall thickening is noted.   CONCLUSION: FINDINGS:  No evidence for DEEP vein thrombus. There is a SUPERFICIAL thrombus right gland mid calf varicosity thrombosed. The deep venous system of the right lower extremity is patent, showing compressibility, color flow, and spectral Doppler tracings. hypothyroidism    Disposition:  Admitted    Follow-up:  No follow-up provider specified.     Medications Prescribed:  Current Discharge Medication List

## 2017-03-05 NOTE — SLP NOTE
ADULT SWALLOWING EVALUATION    ASSESSMENT & PLAN   ASSESSMENT  Pt seen for BSSE. Pt's family at the bedside, RN aware of order. Pt alert, verbal and able to describe nature of swallowing problem.  Pt stated that she feels a \"full feeling in the throat\" wh dentures; Lower dentures  Symmetry: Within Functional Limits  Strength:  Within Functional Limits  Tone: Within Functional Limits  Range of Motion: Within Functional Limits  Rate of Motion: Within Functional Limits    Voice Quality: Clear  Respiratory Status

## 2017-03-06 NOTE — CM/SW NOTE
03/06/17 1500   CM/SW Referral Data   Referral Source Social Work (self-referral)   Reason for Referral Discharge planning   Informant Patient; Children   Readmission Assessment   Factors that patient feels contributed to this readmission (Pt was put on

## 2017-03-06 NOTE — PLAN OF CARE
Problem: Impaired Swallowing  Goal: Minimize aspiration risk  Interventions:  - Patient should be alert and upright for all feedings (90 degrees preferred)  - Puree/thin  - No straws  - Encourage small sips of liquid    - Discontinue feeding and notify MD

## 2017-03-06 NOTE — CONSULTS
Supplement discontinuation per Policy:    For patient  Dick, fish oil has been discontinued per policy. This supplement may be restarted upon discharge using the medication reconciliation process.     Thanks,    Baldo Floyd, Pharm D

## 2017-03-06 NOTE — CONSULTS
[facility]  Report of Inpatient Wound/Ostomy Consultation    Emy Mathew Patient Status:  Inpatient    1930 MRN [id number]   [facility] 4NW-A Attending Lamar Ray MD     History of Present Illness:  Emy Mathew is a a( patient. Time [id number], Thank you.     Sepideh Arriaga RN, BSN, [facility]  Wound/Ostomy care pager [phone]  3/6/2017  2:55 PM

## 2017-03-06 NOTE — VIDEO SWALLOW STUDY NOTE
ADULT VIDEOFLUOROSCOPIC SWALLOWING STUDY    Admission Date: 3/4/2017  Evaluation Date: 03/06/2017  Radiologist: Dr. Chris Maria     Dear Dr. Yusuf Yepez,  This letter is to inform you of Bassam Jennings Videofluoroscopic Swallowing Study results and/or plan of mana swallowing    ASSESSMENT   DYSPHAGIA ASSESSMENT  Test completed in conjunction with Radiologist.  Patient Positioned: Upright;CURT chair. Patient Viewed: Lateral.  Patient Alertness: Fully alert. Consistencies Presented:  Thin liquids;Puree;Hard solid to a the laryngeal vestibule upon completion of the swallow. Mild vallecular and pyriform sinus residue after thin which was cleared via repeat dry swallow.  No evidence of Zenkers diverticulum, stricture or UES dysfunction to explain pt sensation of solids sti

## 2017-03-06 NOTE — PLAN OF CARE
GASTROINTESTINAL - ADULT    • Minimal or absence of nausea and vomiting Progressing    • Maintains adequate nutritional intake (undernourished) Progressing        Impaired Swallowing    • Minimize aspiration risk Progressing        METABOLIC/FLUID AND ELEC

## 2017-03-06 NOTE — PROGRESS NOTES
[facility]  Progress Note     Valentin Harry Patient Status:  Inpatient    1930 MRN [id number]   [facility] 4NW-A Attending Néstor Cameron MD   Hosp Day # 2 PCP Monique Upton MD     Chief Complaint: SAEID    S: Patient reports l packet Oral TID   • cholestyramine light  4 g Oral Daily   • diphenoxylate-atropine  1 tablet Oral TID   • multivitamin with minerals  1 tablet Oral Daily   • omega-3 fatty acids  1,000 mg Oral Nightly   • Heparin Sodium (Porcine)  5,000 Units Subcutaneous

## 2017-03-06 NOTE — PHYSICAL THERAPY NOTE
PHYSICAL THERAPY EVALUATION - INPATIENT     Room Number: 420/420-A  Evaluation Date: 3/6/2017  Type of Evaluation: Initial  Physician Order: PT Eval and Treat    Presenting Problem: Dehydration, hypokalemia, transaminitus, SAEID and hypothyroidism  Reaso Cognitive Status:  WFL - within functional limits  · Arousal/Alertness:  appropriate responses to stimuli  · Orientation Level:  oriented x4  · Following Commands:  follows multistep commands consistently  · Safety Judgement:  good awareness of safety prec RW, however required VC's for safety when completing t/f and use of AD. Pt then amb 500' using RW and CGA, c family present during mobility. Pt is intermittently attempting to take a hand off the RW, however returned to safe position c VC's.  Pt then return Goal #3 Patient is able to ambulate 600 feet with assist device: walker - rolling at assistance level: modified independent     Goal #4 Pt will be indep c HEP prior to DC   Goal #5    Goal #6    Goal Comments: Goals established on 3/6/2017

## 2017-03-06 NOTE — PLAN OF CARE
Minimize aspiration risk Progressing      Patient/Family Long Term Goal Progressing    Assumed pt this morning. Pt denied any c/o pain/ discomfort all day. Ambulated in hallway with family.  No shortness of breath noted Pt and family understand TPN will be r

## 2017-03-06 NOTE — PROGRESS NOTES
[facility]  Progress Note    Annette Trinidad Patient Status:  Inpatient    1930 MRN [id number]   [facility] 4NW-A Attending Los Don MD   Hosp Day # 2 PCP Jeromy Ortega MD     Subjective:  Feels much better today.  Pain contr Colonic mass     Cachexia (HCC)     Malignant neoplasm of ascending colon (HCC)     Pleural effusion, bilateral     Pleural effusion     Pneumoperitoneum     SAEID (acute kidney injury) ([facility] [address]     Hypokalemia     Hyponatremia     Diarrhea     Malignant neopl

## 2017-03-06 NOTE — PROGRESS NOTES
Patient has been up ambulating in hallway with a walker and family has a steady gait denies any c/o sob . Reviewed plan of care with patient and her son. informed patient and her son about new diet and the plan to increase diet as patient tolerates.

## 2017-03-06 NOTE — DIETARY NOTE
Nutrition Short Note    Pt tolerating TPN without difficulty, appropriate to advance towards goal with tonight's TPN. TPN tonight to provide 900 NPC (620 dextrose calories, 280 lipid calories), 23% lipids, 75 grams protein.  This will provide 1200 calories

## 2017-03-07 NOTE — CM/SW NOTE
OSITO met w/pt and pt's daughter regarding discharge plan. Pt agreeable to dc home w/TPN. SW explained process. Pt agreeable to home health and would like [facility] home health since she lives in [place]. OSITO sent referral via [facility]. Completed face to face.  OSITO sent

## 2017-03-07 NOTE — WOUND PROGRESS NOTE
[facility]  Inpatient Ostomy Progress Note    Zackery Duran Patient Status:  Inpatient    1930 MRN [id number]   [facility] 4NW-A Attending Ghassan Beatty MD   Days in hospital 3 Primary Lilia Mercado MD     History of Present [place] allowing me to participate in the care of your patient.     Justin Bolaños RN, BSN, [facility] nurse  Pager [phone]

## 2017-03-07 NOTE — PLAN OF CARE
Diabetes/Glucose Control    • Glucose maintained within prescribed range Progressing        GASTROINTESTINAL - ADULT    • Minimal or absence of nausea and vomiting Progressing    • Maintains adequate nutritional intake (undernourished) Progressing        I

## 2017-03-07 NOTE — PROGRESS NOTES
[facility]  Progress Note    Remigio Finley Patient Status:  Inpatient    1930 MRN [id number]   [facility] 4NW-A Attending Margareth Cline MD   Hosp Day # 3 PCP Peterchristine Dela Cruz MD     Subjective:  Pt sitting up in bed, had cream of Anemia     Malnutrition (HCC)     Metabolic acidosis     At risk for falling     Colonic mass     Cachexia (HCC)     Malignant neoplasm of ascending colon (HCC)     Pleural effusion, bilateral     Pleural effusion     Pneumoperitoneum     SAEID (acute kidne

## 2017-03-07 NOTE — PROGRESS NOTES
Patient appetite fair needs a lot of encouragement with eating patient states that she has no appetite or desire to eat. patient has been up ambulating in caro with her walker gait is steady. recieved tylenol for c/o pain that was effective.  Plan of care rev

## 2017-03-07 NOTE — PLAN OF CARE
Problem: Impaired Swallowing  Goal: Minimize aspiration risk  Interventions:  - Patient should be alert and upright for all feedings (90 degrees preferred)  -Regular, soft/thin  - No straws  - Encourage small sips of liquid    - Discontinue feeding and not

## 2017-03-07 NOTE — PROGRESS NOTES
[facility]  Progress Note     Mara Lee Patient Status:  Inpatient    1930 MRN [id number]   [facility] 4NW-A Attending Ruchi Gomes MD   Hosp Day # 3 PCP Halima Ruth MD     Chief Complaint: SAEID    S: Patient had cream Epic.    Medications:   • psyllium  1 packet Oral TID   • cholestyramine light  4 g Oral Daily   • diphenoxylate-atropine  1 tablet Oral TID   • multivitamin with minerals  1 tablet Oral Daily   • Heparin Sodium (Porcine)  5,000 Units Subcutaneous Q8H   •

## 2017-03-07 NOTE — SLP NOTE
SPEECH DAILY NOTE - INPATIENT    Evaluation Date: 03/07/2017    ASSESSMENT & PLAN   ASSESSMENT  Pt seen at bedside this AM for dysphagia therapy session. Pt cooperative, pleasant, and alert. Pt's daughter present at bedside.  Per RN, pt, and pt's daughter r Interdisciplinary Communication: Discussed with RN    GOALS  Goal #1  The patient will tolerate puree consistency and thin liquids without overt signs or symptoms of aspiration with 95 % accuracy over 2-3 session(s).  MET   Goal #2  The patient/family/c

## 2017-03-07 NOTE — CM/SW NOTE
Met with patient and daughter at bedside. Explained she is part of a BPCI with [employer], information given, and is willing to accept the program.  Discussed discharge plan; home with home health.     Francisca Cavazos RN,   Phone - [phone]

## 2017-03-07 NOTE — CM/SW NOTE
SW received call from [facility] and stated they will not have a nurse available for the pt. Pt's daughter mentioned [facility] [phone]. [facility] stated they do not have an RN available to service [place].  Daughter agreeable to a referral [facility]

## 2017-03-07 NOTE — DIETARY NOTE
Nutrition Short Note    Pt tolerating TPN without difficulty. TPN tonight to provide: 900 NPC (620 dextrose calories, 280 lipid calories), 23% lipids, 75 grams protein in 1500 ml x 12 hours.  This will provide 1200 calories, 75 grams protein meeting 80% pt

## 2017-03-08 NOTE — PHYSICAL THERAPY NOTE
PHYSICAL THERAPY TREATMENT NOTE - INPATIENT    Room Number: 420/420-A     Session: 1   Number of Visits to Meet Established Goals: 3    Presenting Problem: Dehydration, hypokalemia, transaminitus, SAEID and hypothyroidism    Problem List  Principal Problem: wheelchair)?: None   -   Need to walk in hospital room?: A Little   -   Climbing 3-5 steps with a railing?: A Little       AM-PAC Score:  Raw Score: 21   PT Approx Degree of Impairment Score: 28.97%   Standardized Score (AM-PAC Scale): 50.25   CMS Modifier home health PT/OT;Other (Comment) (c initial family assist PRN)     PLAN  PT Treatment Plan: Bed mobility; Body mechanics; Endurance; Patient education; Family education;Gait training;Strengthening;Transfer training;Balance training  Rehab Potential : Good  Fr

## 2017-03-08 NOTE — PROGRESS NOTES
[facility]  Progress Note    Mary Appiah Patient Status:  Inpatient    1930 MRN [id number]   [facility] [place] 4NW-A Attending Aubrey Biswas MD   Hosp Day # 4 PCP Cassie Dela Cruz MD     Subjective:  Patient tolerated muffin and coffee 03/08/2017       Lab Results  Component Value Date   MG 2.3 03/08/2017   PHOS 3.9 03/08/2017       Assessment:  Patient Active Problem List:     Microcytic hypochromic anemia     Pelvic mass in female     Malignant ascites     Small bowel obstruction ([facility] [address] daughter in law    POC discussed with pt, pt's daughter, RN and wound RN       Caleb Harrington  [facility]  3/8/2017  1:26 PM    ADDENDUM: Rationale for home TPN.   Upon admission, the patient had experienced a 25 pound weight loss since her la

## 2017-03-08 NOTE — SLP NOTE
SPEECH DAILY NOTE - INPATIENT    Evaluation Date: 03/08/2017    ASSESSMENT & PLAN   ASSESSMENT  Pt seen at bedside this AM for speech therapy services. Pt cooperative, pleasant, and alert. Per RN, pt tolerating diet well with no concerns of aspiration.  Tri with min assistance 95 % of the time across 2 sessions.  MET                                                      FOLLOW UP  Follow Up Needed: No  SLP Follow-up Date:  (n/a)  Number of Visits to Meet Established Goals: 3  Session: 2/3    If you have any que

## 2017-03-08 NOTE — PLAN OF CARE
GASTROINTESTINAL - ADULT    • Maintains adequate nutritional intake (undernourished) Not Progressing          GASTROINTESTINAL - ADULT    • Minimal or absence of nausea and vomiting Progressing        Impaired Functional Mobility    • Achieve highest/safes

## 2017-03-08 NOTE — PLAN OF CARE
GASTROINTESTINAL - ADULT    • Minimal or absence of nausea and vomiting Progressing    • Maintains adequate nutritional intake (undernourished)  Tolerating fair amt regular diet. Denies nausea. Megace started to increase appetite.  New medication info given

## 2017-03-08 NOTE — CM/SW NOTE
OSITO spoke w/Tiffanie from [phone] who stated the most likely will not be able to get insurance auth for the TPN. Ngozi Parish stated the pt does not meet criteria because he bowel resection \"was not big enough. \" CA is not a qualifying dx.  Pt is also

## 2017-03-08 NOTE — DISCHARGE SUMMARY
[facility]  DISCHARGE SUMMARY     Camilo Epstein Patient Status:  Inpatient    1930 MRN [id number]   [facility] 4NW-A Attending Yvette Abdi MD   [facility] Day # 5 PCP Janet Martinez MD     Date of Admission: 3/4/2017  Date of Disc states that anything she eats ends up in her ostomy. She has had 25 lb weight loss over the past week to 10 days. She denies any fevers or chills. She denies any N/V. She denies any CP or SOB. She feels weak and tired.      Brief Synopsis: Patient is a 80-y cholestyramine light 4 g Pack   Last time this was given:  4 g on 3/9/2017  9:39 AM   Commonly known as:  PREVALITE        Take 1 packet (4 g total) by mouth daily.     Stop taking on:  4/7/2017   Quantity:  30 packet   Refills:  0       Megestrol Acetate 4 Provider Nghia Fonseca   3/14/2017 2:00 PM Daina Henry MD [address]   3/15/2017 1:40 PM Brent Cowart MD [facility] [place]   4/6/2017 11:00 AM Aidan Hartman MD [facility]   6/26/2017 9:00 AM Brent Cowart MD EM

## 2017-03-08 NOTE — PROGRESS NOTES
[facility]  Progress Note     Honor Must Patient Status:  Inpatient    1930 MRN [id number]   [facility] 4NW-A Attending Torito Matt MD   Hosp Day # 4 PCP Ailyn Way MD     Chief Complaint: SAEID    S: Patient had half insulin aspart  1-5 Units Subcutaneous Q6H       ASSESSMENT / PLAN:     1. Acute Kidney Injury /2 dehydration from diarrhea, resolved  1. TPN, try to slow GI output, encourage po intake  2. Diarrhea  1. stool studies - negative   2.  Tyron Pearl an

## 2017-03-09 NOTE — PLAN OF CARE
GASTROINTESTINAL - ADULT    • Minimal or absence of nausea and vomiting Progressing    • Maintains adequate nutritional intake (undernourished) Progressing        METABOLIC/FLUID AND ELECTROLYTES - ADULT    • Glucose maintained within prescribed range Prog

## 2017-03-09 NOTE — PROGRESS NOTES
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 The patient has gained some weight since admission. This patient is not appropriate for a J-tube or G-tube placement, as enteral feedings would simply contribute to her high ostomy output.   She does not appear to b

## 2017-03-09 NOTE — PHYSICAL THERAPY NOTE
PHYSICAL THERAPY TREATMENT NOTE - INPATIENT    Room Number: 420/420-A     Session: 2  Number of Visits to Meet Established Goals: 3    Presenting Problem: Dehydration, hypokalemia, transaminitus, SAEID and hypothyroidism    Problem List  Principal Problem: (including a wheelchair)?: None   -   Need to walk in hospital room?: A Little   -   Climbing 3-5 steps with a railing?: A Little       AM-PAC Score:  Raw Score: 22   PT Approx Degree of Impairment Score: 20.91%   Standardized Score (AM-PAC Scale): 53.28 Potential : Good  Frequency (Obs): 3x/week    CURRENT GOALS   Goal #1  Patient is able to demonstrate supine - sit EOB @ level: modified independent       Goal #2  Patient is able to demonstrate transfers Sit to/from Stand at assistance level: modified ind

## 2017-03-09 NOTE — DIETARY NOTE
Nutrition Short Note    Per discussion with RN, pt to discharge home today. TPN not reordered for tonight - MAUREEN alerted pharmacy.     Winston Shone, [address], [address], [address] [facility]  Pager [phone] [address] [phone]

## 2017-03-09 NOTE — CM/SW NOTE
OSITO spoke w/Tuyet from [facility] who confirmed they have insurance auth for the TPN. TPN to be delivered between 4 and 6pm. OSITO provided Ghazala Easley w/updated TPN orders. Erick Medeiros confirmed they will arrive at 6:00pm. [facility] to coordinate w/andrew.  Mariposa Dorsey

## 2017-03-10 NOTE — PROGRESS NOTES
Initial Post Discharge Follow Up   Discharge Date: 3/9/17  Contact Date: 3/10/2017    Consent Verification:  Assessment Completed With: Caregiver: daughter-in-law Permission received per patient?  written  HIPAA Verified? Yes    1.  Tell me why you were in tablet by mouth 2 (two) times daily. Disp:  Rfl:    Multiple Vitamins-Minerals (CENTRUM SILVER) Oral Tab Take 1 tablet by mouth daily. Disp:  Rfl:        4. What questions do you have about your medications? No questions per daughter-in-law.     5. How ofte Radha Mesa MD [facility] [address], Alessandro Rod ([facility])    Thursday April 06, 2017 11:00 AM Exam - Established Patient with Riya Jean Baptiste MD [facility] [address], Allison Penn ([facility])

## 2017-03-10 NOTE — TELEPHONE ENCOUNTER
Lindsay Kuhn RN from [facility] started care for pt yesterday, VSS, ostomy fine, TPN started, but pt having diarrhea and was on Lomotil in hospital and this is listed on her discharge summary.  She did not get a script for this and they are requesting it

## 2017-03-14 NOTE — PROGRESS NOTES
Education Record    Learner:  Patient    Disease / Diagnosis:    Barriers / Limitations:  None   Comments:    Method:  Discussion   Comments:    General Topics:  Plan of care reviewed   Comments:    Outcome:  Shows understanding   Comments:    Written info

## 2017-03-15 NOTE — PROGRESS NOTES
Cancer Center Progress Note  Patient Name: Luann Stephenson   YOB: 1930   Medical Record Number: [id number]   CSN: [id number]   Attending Physician: Caridad Camargo M.D.        Date of Visit: 3/14/2017     Chief Complaint:  Patient present excision:  -Invasive, moderately-differentiated colonic adenocarcinoma, measuring 6.5 cm in greatest dimension, arising in the cecum close to the ileocecal junction.  (See comment.)      -Adenocarcinoma extends through the muscular wall and into pericolonic Family history unknown: Yes       Social History:    Social History   Marital Status: Single  Spouse Name: N/A    Years of Education: N/A  Number of Children: N/A     Occupational History  None on file     Social History Main Topics   Smoking status: Forme incontinence. No abnormal bleeding. Integumentary No rashes or yellowing of the skin   Neurologic No headache, blurred vision, and no areas of focal weakness. Psychiatric No insomnia, depression, zuri or mood swings.          Vital Signs:  /54 m    -Weakly proliferative endometrium with cystic atrophy.     -Blood vessels with marked medial calcific sclerosis.      -One fallopian tube with no pathologic change.     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      -No evidence of malignancy.      B- would be FOLFOX, it is unlikely that she would tolerate it in her current condition. We discussed the possible use of single agent Xeloda. We reviewed the risks, benefits, and side effects. The plan would be to start within 6-8 weeks of her surgery.   Sh

## 2017-03-15 NOTE — PROGRESS NOTES
HPI:    Amber De is a 80year old female here today for hospital follow up.    She was discharged from Inpatient hospital, [facility] to Home   Admission Date: 3/4/17   Discharge Date: 3/9/17  Hospital Discharge Diagnosis: colon cancer  KASI Luke dysphasia, hypothyroidism, pressure sore coccyx, malnutrition; still recommend for TCM follow-up    Please note that only [facility] and [facility] patients enrolled in the Medicare ACO, [facility] and [address] [place] [address] will be handled by the [facility] Care Management team.  For a protein powder as well as scrambled eggs  Denies fever, emesis, or nausea    Allergies:  She has No Known Allergies.     Current Meds:    Current Outpatient Prescriptions on File Prior to Visit:  acetaminophen 325 MG Oral Tab Take 2 tablets (650 mg total) b depression or anxiety  HEMATOLOGIC: denies hx of anemia, or bruising, denies bleeding        PHYSICAL EXAM:   No LMP recorded.  Patient is postmenopausal.  Estimated body mass index is 15.37 kg/(m^2) as calculated from the following:    Height as of this en severe  · Amount and/or Complexity of Data to Be Reviewed: severe  · Risk of Significant Complications, Morbidity, and/or Mortality: severe    Overall Risk:   moderate    Patient seen within 7 days from date of discharge.      Osman De Jesus MD, 3/15/2017

## 2017-03-15 NOTE — PATIENT INSTRUCTIONS
Anemia During Cancer    When levels of healthy red blood cells (RBCs) in the body drop to levels that are below normal, the condition is called anemia. Anemia can occur during cancer and its treatment for many reasons.  Read below to learn more about anem · RBC transfusion. A tube (cannula) is put into a vein in the hand or arm. RBCs from a donor are sent through the tube into the body. This increases the number of healthy RBCs in the body. This can reverse anemia very quickly. RBC transfusions are safe.  Rocio Nunez There's a lot more to this food group than just meat and beans. It also includes nuts, seeds, and eggs.  There are all sorts of nutrient-rich choices:  · Chicken and turkey with the skin removed  · Fish and shellfish  · Lean beef, pork, or lamb (without vis

## 2017-03-28 PROBLEM — E46 MALNUTRITION (HCC): Status: RESOLVED | Noted: 2017-01-01 | Resolved: 2017-01-01

## 2017-03-28 PROBLEM — E86.0 DEHYDRATION: Status: RESOLVED | Noted: 2017-01-01 | Resolved: 2017-01-01

## 2017-03-28 PROBLEM — N17.9 AKI (ACUTE KIDNEY INJURY) (HCC): Status: RESOLVED | Noted: 2017-01-01 | Resolved: 2017-01-01

## 2017-03-28 PROBLEM — Z91.81 AT RISK FOR FALLING: Status: RESOLVED | Noted: 2017-01-01 | Resolved: 2017-01-01

## 2017-03-28 PROBLEM — E03.9 HYPOTHYROIDISM, UNSPECIFIED TYPE: Status: RESOLVED | Noted: 2017-01-01 | Resolved: 2017-01-01

## 2017-03-28 PROBLEM — Z93.3 COLOSTOMY STATUS (HCC): Status: ACTIVE | Noted: 2017-01-01

## 2017-03-28 PROBLEM — R74.01 TRANSAMINITIS: Status: RESOLVED | Noted: 2017-01-01 | Resolved: 2017-01-01

## 2017-03-28 PROBLEM — R19.00 PELVIC MASS IN FEMALE: Status: RESOLVED | Noted: 2017-01-01 | Resolved: 2017-01-01

## 2017-03-28 NOTE — PATIENT INSTRUCTIONS
Anemia  Anemia is a condition that occurs when your body does not have enough healthy red blood cells (RBCs). Your RBCs are the parts of your blood that carry oxygen throughout your body.  A protein called hemoglobin allows your RBCs to absorb and release · Complete blood cell count (CBC). This test measures the amounts of the different types of blood cells. · Blood smear. This test checks the size and shape of your blood cells.  To perform the test, your doctor views a drop of your blood under a microscope © 2168-4326 [address], [address] [place]. All rights reserved. This information is not intended as a substitute for professional medical care. Always follow your healthcare professional's instructions.

## 2017-03-28 NOTE — PROGRESS NOTES
Barbara Pierre is a 80year old female. Patient presents with:  Breathing Problem: LEGS SHAKEY---- RM 6  Wound: COLON SURUGERY FEB 16---- OSTOMY SITE PULLING APART      HPI:   Patient has several complaints.     One is that she has been short of breath re Oral Cap Take 1,000 mg by mouth nightly. Disp:  Rfl:    CALCIUM OR Take 1 tablet by mouth 2 (two) times daily. Disp:  Rfl:    Multiple Vitamins-Minerals (CENTRUM SILVER) Oral Tab Take 1 tablet by mouth daily.  Disp:  Rfl:      No current facility-administer clear to auscultation  CARDIO: RRR without murmur  GI: good BS's,no masses, HSM or tenderness  EXTREMITIES: no cyanosis, clubbing or edema    ASSESSMENT AND PLAN:     Malignant neoplasm of ascending colon (hcc)  (primary encounter diagnosis)  Protein calor understanding of these issues and agrees to the plan.

## 2017-03-28 NOTE — ASSESSMENT & PLAN NOTE
This is currently intact and is only pressure area only. Continued to use the padding given and rotate pressure on this.   Of note the patient is mobile and independent at home

## 2017-03-28 NOTE — TELEPHONE ENCOUNTER
Merline Mitchell, MD Patsey Echevaria, RN (You)   1 hour ago   (12:00 PM)    Patient has an an appointment on 4/4 to review the PET. (Routing comment)    Pt's son aware of MD response and verbalized understanding.

## 2017-03-29 NOTE — TELEPHONE ENCOUNTER
Patient son returned call. Wanted PET results prior to appointment. Per Dr. Chema Farmer results to be given at appointment on 4/4. Son stated understanding.

## 2017-03-31 NOTE — TELEPHONE ENCOUNTER
Spoke to Jarret in regards to pt having chest pain last night. Jarret states that pt states that there were squeezing in nature. During the \"episode\" pt did not experience and radiating pain through neck, back, jaw, or left arm, sweating, nausea, vomiti

## 2017-03-31 NOTE — TELEPHONE ENCOUNTER
Spoke with Dr. Kevin Deshpande in regards to phone note; per Dr. Kevin Deshpande since pt is asymptomatic there is nothing that the office could do currently but if chest pain developes again pt is to go to ER. Radha Park advised and verbalized understanding.  Radha Park asked i

## 2017-04-04 NOTE — PROGRESS NOTES
Nutrition Consultation    Patient Name: Yesenia Duarte  YOB: 1930  Medical Record Number: [id number]   Account Number: [de-identified]  Dietitian: Viola Kearns RD    Date of visit: 4/4/2017    Diet Rx: High protein/calorie as tolerated w/ il SILVER) Oral Tab, Take 1 tablet by mouth daily. , Disp: , Rfl:     Pertinent Labs: ALB 3.4 mg/dl    Height: 4'11\"       Weight: 82 lbs  IBW: 95 +/- 10%    WT HX:   04/04/17: 82 lbs  03/14/17: 78 lbs  03/07/17: 75 lbs  03/05/17 :77 lb  03/02/17 :100 lb  02/ Thank you for allowing me to participate in the care of Pj Smith.

## 2017-04-04 NOTE — PROGRESS NOTES
Education Record    Learner:  Patient    Disease / Diagnosis:    Barriers / Limitations:  None   Comments:    Method:  Discussion   Comments:    General Topics:  Medication   Comments:    Outcome:  Shows understanding   Comments:

## 2017-04-06 NOTE — PROGRESS NOTES
Follow Up Visit Note       Active Problems      1. Colostomy status ([facility] [address]    2. Protein calorie malnutrition ([facility] [address]    3.  Malignant neoplasm of ascending colon [facility])          Chief Complaint   Patient presents with:  Colon Problem: pt here for 4 week follow family history and social history have been reviewed by me today.     Family History   Problem Relation Age of Onset   • Family history unknown: Yes     Social History    Marital Status: Single              Spouse Name:                       [facility] cough, shortness of breath and wheezing. Cardiovascular: Negative for chest pain, palpitations and leg swelling.    Gastrointestinal: Negative for nausea, vomiting, abdominal pain, diarrhea, constipation, blood in stool, abdominal distention and anal ble patient's questions were answered in detail and to the patient's satisfaction. The patient voiced understanding of the postoperative care plan. ·   ·        No orders of the defined types were placed in this encounter.        Imaging & Referrals   None

## 2017-04-10 NOTE — PROGRESS NOTES
Cancer Center Progress Note  Patient Name: Thomas Brooks   YOB: 1930   Medical Record Number: [id number]   CSN: [id number]   Attending Physician: Pamela Gilliam M.D.        Date of Visit: 4/4/17     Chief Complaint:  Patient presents w B- Right colon, excision:  -Invasive, moderately-differentiated colonic adenocarcinoma, measuring 6.5 cm in greatest dimension, arising in the cecum close to the ileocecal junction.  (See comment.)      -Adenocarcinoma extends through the muscular wall Family history unknown: Yes       Social History:    Social History   Marital Status: Single  Spouse Name: N/A    Years of Education: N/A  Number of Children: N/A     Occupational History  None on file     Social History Main Topics   Smoking status: Forme cough or hemoptysis. Cardiovascular No anginal chest pain, palpitations or orthopnea. Gastrointestinal No nausea, vomiting, diarrhea, GI bleeding, or constipation. Genitorurinary No hematuria, dysuria, abnormal bleeding.    Integumentary No rashes, N neoplastic disease cannot be excluded. Continued followup is   recommended.      3.  Ectasia of the proximal ascending aorta.     PET CT: CONCLUSION:  Findings consistent with metastatic metabolism involving the hepatogastric region and right upper quadrant    -One incidental benign lymph node (0/1).      -Margins of resection viable and unremarkable. Impression and Plan:  Colon Cancer: The final staging of the patient's colon cancer was pT3, pN2b, M0.  The large pelvic mass and small bowel were not invo

## 2017-04-11 NOTE — TELEPHONE ENCOUNTER
PTs daughter called saying that they are having a hard time trying to refill cholestyramine and Dr Kelly Loya wanted the PT to keep taking it. The daughter is requesting that Dr Kelly Loya take over as the ordering MD so they can get it filled.  Will have a [employer]

## 2017-04-26 NOTE — TELEPHONE ENCOUNTER
Daughter called states patient started Xeloda and took as directed daily for 4 days. She became very dehydrated and was rushed to ER in [place]. She was hospitalized for 3 days. She is currently home but does not want to continue Xeloda.  Denies nausea/

## 2017-04-27 NOTE — PROGRESS NOTES
Initial Post Discharge Follow Up   Discharge Date: 4/25/2017 from [facility]    DX: dehydration  Contact Date: 4/27/2017    Consent Verification:  Assessment Completed With: Caregiver: Charmayne Gibbon received per patient?  written  HIPAA Verified? mouth 3 (three) times daily. Disp:  Rfl:    omega-3 fatty acids 1000 MG Oral Cap Take 1,000 mg by mouth nightly. Disp:  Rfl:    CALCIUM OR Take 1 tablet by mouth 2 (two) times daily.  Disp:  Rfl:    Multiple Vitamins-Minerals (CENTRUM SILVER) Oral Tab Take [address] on the second floor.     Tuesday May 02, 2017  3:45 PM HEMATOLOGY ONCOLOGY FOLLOW UP with Asaf Henry MD [facility] in [facility])    Friday May 05, 2017  3:30 PM Exam - Est discussed. - cannot discuss until tomorrow. Daughter has all info at home and will bring in with her.

## 2017-04-28 NOTE — PROGRESS NOTES
HPI:    Tricia Martinez is a 80year old female here today for hospital follow up.    She was discharged from Inpatient hospital, [facility] to Home   Admission Date: 3/4/17   Discharge Date: 3/9/17  [address] [place] [address] gram prognosis, and she at this point would like to be as comfortable as possible in her own home.   She is evaluating different hospice settings, she is also aware that at some point she will be unable to care for herself and this worries her as she is not pain on exertion or palpitations  GI: denies abdominal pain,   Poor appetite  Denies nausea  MUSCULOSKELETAL: denies pain, normal range of motion of extremities  Weak but ambulates indep with cane  NEURO: denies headaches, denies dizziness, denies weakness wishes to remain in her private home     Weight loss         Fatigue, unspecified type               45 minutes of total time--face to face with patient, spent responding to queries and concerns.   Over 50% of time was discussion:  Allayed concerns--discuss

## 2017-04-28 NOTE — PATIENT INSTRUCTIONS
What Is Hospice? Hospice provides comfort and support to people who are nearing the end of life. This helps them and their family let go with dignity. Hospice focuses on quality of life.  And it helps someone live his or her last days with a sense of con Hospice is most commonly provided in the home. It can be offered in the nursing home, assisted living facility, or hospital depending on the person's care needs.  Hospice care is provided by a team. The team has a health care provider and nurse trained in h · Hospice offers a specific treatment only. Patients receive treatments and interventions to maximize comfort and quality of life.  In some cases, patients may continue to receive the same treatments they received before hospice, such as palliative chemothe · Hospice care does not shorten life or bring about death. In fact, hospice patients have been shown to last longer (as long as 100 days in heart failure) as compared to similarly ill patients who do not receive hospice care.   · Some patients fear that th

## 2017-04-28 NOTE — ASSESSMENT & PLAN NOTE
Stressed the importance of high-protein meals, but also taking in solid food. This was stressed with the patient in presence of the daughter.

## 2017-05-02 NOTE — PROGRESS NOTES
NUTRITION F/U NOTE: Pt chart reviewed. Noted pt decided to discontinue tx and has chosen hospice care. RD spoke w/ oncologist. RD available prn.

## 2017-05-02 NOTE — PROGRESS NOTES
Education Record    Learner:  Patient and Family Member    Disease / Diagnosis:    Barriers / Limitations:  None   Comments:    Method:  Discussion   Comments:    General Topics:  Plan of care reviewed   Comments:    Outcome:  Shows understanding   Comment

## 2017-05-09 ENCOUNTER — MED REC SCAN ONLY (OUTPATIENT)
Dept: FAMILY MEDICINE CLINIC | Facility: CLINIC | Age: 82
End: 2017-05-09

## 2017-05-18 NOTE — PROGRESS NOTES
Cancer Center Progress Note  Patient Name: Imani Alonso   YOB: 1930   Medical Record Number: [id number]   CSN: [id number]   Attending Physician: Gurdeep Cummins M.D.        Date of Visit: 5/2/17     Chief Complaint:  Patient presents w B- Right colon, excision:  -Invasive, moderately-differentiated colonic adenocarcinoma, measuring 6.5 cm in greatest dimension, arising in the cecum close to the ileocecal junction.  (See comment.)      -Adenocarcinoma extends through the muscular wall History   Problem Relation Age of Onset   • Family history unknown: Yes       Social History:    Social History   Marital Status: Single  Spouse Name: N/A    Years of Education: N/A  Number of Children: N/A     Occupational History  None on file     Social appetite and thirst.   Genitorurinary  No hematuria, dysuria, abnormal bleeding, or incontinence. Integumentary No rashes or yellowing of the skin   Neurologic No headache, blurred vision, and no areas of focal weakness.     Psychiatric No insomnia, depre through the muscular wall and into pericolonic fat. -Lymphovascular invasive identified.      -Appendix and small bowel, negative for malignancy.     -Background colonic mucosa with separate tubular adenoma (0.3 cm).       -Eight of thirty seven lymph node Follow Up:  As needed    I spent  minutes face to face with the patient. More than 50% of that time was spent counseling the patient and/or on coordination of care.   The diagnosis, prognosis, and general treatment was explained to the patient and the fami

## 2017-05-23 NOTE — TELEPHONE ENCOUNTER
No Spoke with daughter in law. Results of CT scan showed enlarged lymph nodes that will now require a pet scan; verbalized understanding. Will call to schedule.  Son stated patient is considering no treatment but is willing to have pet scan to determine if lisa

## 2020-11-03 NOTE — PROGRESS NOTES
NURSING DISCHARGE NOTE    Discharged Home via Wheelchair. Accompanied by Family member  Belongings Taken by patient/family. no

## 2020-12-22 NOTE — TELEPHONE ENCOUNTER
From: Lana Wesley  To: Tania Cortez MD  Sent: 4/20/2017 5:08 PM CDT  Subject: Non-Urgent Medical Question    Dr. Adore Wynn,    This is Bassam Naqvi's daughter.  As you know, mom is home now and has decided to try the chemo prescribed by Dr. Daphne aCse
No

## 2023-05-03 NOTE — MR AVS SNAPSHOT
[facility]  [address] [address]  [phone]               Thank you for choosing us for your health care visit with Magdalena Hernández MD.  We are glad to serve you and happy to provide you with this summary o vitamins, and other nutrients. (See the chart below.) Here are some guidelines for healthy meal planning:  · Eat a wide range of foods. This will help you get all the nutrients you need. · Eat a number of plant proteins throughout the day.   · Plan for andre · Leafy, dark-green vegetables  · Eggs     Getting started  Change to a vegetarian diet slowly. Start by eating more grains, beans, vegetables, and fruits. Make fish, poultry, or meat a side dish. Then slowly cut them out of your diet.  Here are some other Medicare Annual Well Visit with Juan Melendrez MD   [facility] [place] ([facility])    [address] [address] [phone]              Allergies as of Jan 30, 2017     No Known All Weight Reduction Maintain normal body weight (body mass index 18.5-24.9 kg/m2)   DASH eating plan Adopt a diet rich in fruits, vegetables, and low fat dairy products with reduced content of saturated and total fat.    Dietary sodium reduction Reduce dietary track your progress   You don’t need to join a gym. Home exercises work great.  Put more priority on exercise in your life                    Visit [facility] online at  [email or web address] 03-May-2023 22:41

## 2023-08-18 NOTE — ASSESSMENT & PLAN NOTE
Continue to encourage patient to eat as much as possible as often as possible. Daughter-in-law present and confirms that they are pushing high-protein high-calorie foods and encouraging her as much as possible.   Patient not having emesis or diarrhea Left a voicemail message on patient's mother Julio's voicemail informing her to have patient repeat thyroid labs in 3 months around 11/18/23. Orders are in the system for patient. Mother to call back with any questions.

## 2025-06-25 NOTE — OCCUPATIONAL THERAPY NOTE
NAME:Laura Bobby                                           :1989                                                   CHIEF COMPLAINT:  Chief Complaint   Patient presents with   • Office Visit   • Physical         ASSESSMENT/PLAN  1. Routine history and physical examination of adult    2. Menorrhagia with regular cycle    3. Thyroid nodule       Health Maintenance  - Advised to provide immunization report for review thinks she may have gotten Tdap when she started working at [employer] but we need report.  Healthy lifestyle reviewed vaccines reviewed female checkup discussed  - Reminded to use pill box for medications, including B12 supplement  1. Menorrhagia  - Consistent with fibroids, unlikely to resolve spontaneously  - Maintain hydration and continue fluids, vitamins, and iron supplements  - Ordered fibroid blood work  - If iron levels remain low, increase iron supplement dosage  - Consult hematologist prior to surgery due to history of PE and blood clots    2. Iron Deficiency  - Taking one iron tablet daily, improving fatigue without constipation  - Started taking iron on an empty stomach for better absorption  - Ordered blood work to check current iron levels  - If still low, increase iron supplement dosage    3. Thyroid Nodules  - Ordered follow-up thyroid ultrasound to monitor nodules, especially the one in left lower lobe (0.9 cm)  - Previous thyroid function tests normal, no need for supplementation    4. Sebaceous Cyst  - Recurrent cyst on chest  - Advised to see general surgeon for evaluation and potential removal    5. Scoliosis  - Manages with stretching exercises  - Needs more physical activity due to prolonged sitting        Orders Placed This Encounter   • US THYROID   • CBC with Automated Differential   • Comprehensive Metabolic Panel   • Thyroid Stimulating Hormone   • Lipid Panel With Reflex   • Vitamin D -25 Hydroxy   • Iron And total Iron Binding Capacity   • Ferritin   •  OCCUPATIONAL THERAPY EVALUATION - INPATIENT     Room Number: 315/315-A  Evaluation Date: 2/22/2017  Type of Evaluation: Initial  Presenting Problem: Pelvic mass, SBO, s/p expl lap on 2/16/17 R colectomy and removal of pelvic mass     Physician Order: DANIELA Co Vitamin B12      No follow-ups on file.      HISTORY OF PRESENT ILLNESS:  The patient presents for evaluation of menorrhagia, iron deficiency, thyroid nodules, sebaceous cyst, scoliosis, and health maintenance.    Menorrhagia  - Experienced heavier menstrual cycles and increased cramping over the past year, attributed to perimenopause  - Ultrasound revealed two fibroids (3 cm and 5 cm)  - Dr. Moser suggested monitoring them for 6 months before considering surgery  - Current menstrual cycle started last Wednesday and is ongoing, with the first 2-3 days being particularly heavy  - Bleeding lasts about 10 days  - Manages minor cramps with Advil  - Takes one iron tablet daily, which has improved her energy levels without causing constipation  - Noticed worsening cramps and ankle swelling over the past year, attributed to prolonged sitting  - Gained weight in recent years    Iron Deficiency  - Takes one iron tablet daily, which has improved her energy levels without causing constipation    Thyroid Nodules  - Thyroid ultrasound last year showed 3 nodules below 1 cm  - Follow-up recommended in 1 year    Sebaceous Cyst  - Recurrent cyst on her chest, previously drained but recurred  - Plans to consult a dermatologist    Scoliosis  - Manages scoliosis with stretching exercises  - Acknowledges the need for more physical activity due to a sedentary lifestyle    Supplemental information: She has a history of pulmonary embolism (PE) and blood clots. She is a vegetarian, takes B12 supplements daily, and occasionally experiences upper abdominal pain, believed to be food-related and resolving spontaneously. Previous gallbladder issues improved with a vegetarian diet. No gastrointestinal symptoms or constipation. Received tetanus vaccine through [facility] [facility] 6-7 years ago.    GYNECOLOGICAL HISTORY:  - Last Menstrual Period: 06/2025  - Duration: 10 days  - Frequency and Flow: Heavy for the first 2-3 days  - Menstrual Pain:  reading        RANGE OF MOTION AND STRENGTH ASSESSMENT  Upper extremity ROM is within functional limits     Upper extremity strength is within functional limits except for the following;  Right Shoulder flexion  4/5  Left Shoulder flexion  4/5    COORDINAT Present    FAMILY HISTORY  Father on blood pressure medication. Mother has scleroderma. Brother had jaw surgery and is healthy.    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   • Pulmonary emboli (CMD)     bcp       SOCIAL HISTORY:  Social History     Socioeconomic History   • Marital status: /Civil Union     Spouse name: Not on file   • Number of children: Not on file   • Years of education: Not on file   • Highest education level: Not on file   Occupational History   • Not on file   Tobacco Use   • Smoking status: Never   • Smokeless tobacco: Never   Substance and Sexual Activity   • Alcohol use: Yes   • Drug use: Never   • Sexual activity: Not on file   Other Topics Concern   • Not on file   Social History Narrative   • Not on file     Social Drivers of Health     Financial Resource Strain: Not on file   Food Insecurity: Low Risk  (6/23/2025)    Food Insecurity    • Worried about Food: Never true    • Food is Gone: Never true   Transportation Needs: Not At Risk (6/23/2025)    Transportation Needs    • Lack of Reliable Transportation: No   Physical Activity: Insufficiently Active (11/7/2020)    Exercise Vital Sign    • Days of Exercise per Week: 4 days    • Minutes of Exercise per Session: 30 min   Stress: Not on file   Social Connections: Not on file   Feeling safe in your relationship: Not on file       SURGICAL HISTORY:  History reviewed. No pertinent surgical history.    FAMILY HISTORY:  Family History   Problem Relation Age of Onset   • Autoimmune Disease Mother         scleroderma   • Hyperlipidemia Father    • Hypertension Father    • Cancer, Breast Maternal Aunt    • Additional Onset of  RW x approx 10ft>bathroom.  Pt performed a toilet T/F with supervision assist and toileting including ede-care and clothing management with supervision assist. Pt performed functional mobility with RW and with min assist. Pt performed standing>sit in her c Breast Cancer Maternal Aunt    • BRCA Untested Maternal Aunt        ALLERGIES:  ALLERGIES:   Allergen Reactions   • Sulfa Antibiotics RASH   • Bactrim Ds RASH       MEDICATIONS:  Current Outpatient Medications   Medication Sig Dispense Refill   • Multiple Vitamins-Minerals (PROBIOTICS + BARIATRIC MULTI PO)      • Cyanocobalamin (VITAMIN B-12 PO)      • Ferrous Sulfate (Iron) 325 (65 Fe) MG Tab      • Ascorbic Acid (VITAMIN C PO)      • Multiple Vitamins-Minerals (MULTIVITAMIN ADULT PO)        No current facility-administered medications for this visit.       REVIEW OF SYSTEMS:  Negative other than as listed in HPI      PHYSICAL EXAMINATION:  Blood pressure 128/78, pulse (!) 100, temperature 97.1 °F (36.2 °C), temperature source Temporal, resp. rate 16, height 5' 6.5\" (1.689 m), weight 73.1 kg (161 lb 2.5 oz), last menstrual period 06/18/2025, SpO2 100%.    Constitutional: alert, in no acute distress and current vital signs reviewed.   Head and Face: atraumatic and normocephalic.   Eyes: no discharge, no eyelid swelling and the sclerae were normal.   ENT: normal appearing outer ear  Neck: normal appearing neck and supple neck.   Pulmonary: breath sounds clear to auscultation bilaterally, normal respiratory rate and effort.   Cardiovascular: normal rate, regular rhythm, normal S1, normal S2 and edema was not present in the lower extremities.   Skin, Hair, Nails: normal skin color and pigmentation.       Electronically signed by: Meghan Cotton MD  6/25/2025    FOR PHYSICIAN USE ONLY - Please note that this report was generated using  voice recognition software.  If you require clarification or feel that  there has been an error in this report please contact me through [employer].    perform toileting: with supervision    Functional Transfer Goals  Patient will transfer from bed to chair:  with supervision  Patient will transfer from supine to sit:  with supervision  Patient will transfer from sit to stand:  with supervision  Patient w

## (undated) DEVICE — LAPAROTOMY SPONGE - RF AND X-RAY DETECTABLE PRE-WASHED: Brand: SITUATE

## (undated) DEVICE — VIOLET BRAIDED (POLYGLACTIN 910), SYNTHETIC ABSORBABLE SUTURE: Brand: COATED VICRYL

## (undated) DEVICE — GAUZE SPONGES,USP TYPE VII GAUZE, 12 PLY: Brand: CURITY

## (undated) DEVICE — HEMOCLIP HORIZON LG 004200

## (undated) DEVICE — DRESSING WOUND SILVERLON

## (undated) DEVICE — PROXIMATE SKIN STAPLERS (35 WIDE) CONTAINS 35 STAINLESS STEEL STAPLES (FIXED HEAD): Brand: PROXIMATE

## (undated) DEVICE — COVER,MAYO STAND,STERILE: Brand: MEDLINE

## (undated) DEVICE — BLADE ELECTRODE: Brand: EDGE

## (undated) DEVICE — 3M(TM) MICROPORE TAPE DISPENSER 1535-2: Brand: 3M™ MICROPORE™

## (undated) DEVICE — LAPAROTOMY CDS: Brand: MEDLINE INDUSTRIES, INC.

## (undated) DEVICE — MEDI-VAC SUCTION HANDLE REGULAR CAPACITY: Brand: CARDINAL HEALTH

## (undated) DEVICE — PROXIMATE LINEAR CUTTER RELOAD, BLUE, 75MM: Brand: PROXIMATE

## (undated) DEVICE — PROXIMATE RELOADABLE LINEAR STAPLER: Brand: PROXIMATE

## (undated) DEVICE — POOLE SUCTION HANDLE: Brand: CARDINAL HEALTH

## (undated) DEVICE — SUTURE VICRYL 2-0

## (undated) DEVICE — PROXIMATE RELOADABLE LINEAR CUTTER WITH SAFETY LOCK-OUT, 75MM: Brand: PROXIMATE

## (undated) DEVICE — DRAPE WARMER ORS-300

## (undated) DEVICE — STERILE POLYISOPRENE POWDER-FREE SURGICAL GLOVES: Brand: PROTEXIS

## (undated) DEVICE — SUTURE PDS II 1 TP-1

## (undated) DEVICE — KENDALL SCD EXPRESS SLEEVES, KNEE LENGTH, MEDIUM: Brand: KENDALL SCD

## (undated) DEVICE — CHLORAPREP 26ML APPLICATOR

## (undated) DEVICE — SUTURE VICRYL 0

## (undated) DEVICE — 3M(TM) TEGADERM(TM) TRANSPARENT FILM DRESSING FRAME STYLE 1628: Brand: 3M™ TEGADERM™

## (undated) DEVICE — LIGASURE IMPACT OPEN DEVICE

## (undated) DEVICE — SUTURE VICRYL 0 CT-1

## (undated) DEVICE — SOL  .9 1000ML BTL

## (undated) NOTE — MR AVS SNAPSHOT
[facility]  [address] [address]  [phone]               Thank you for choosing us for your health care visit with Gricelda Ramirez MD.  We are glad to serve you and happy to provide you with this summary o Take 1 tablet (325 mg total) by mouth 2 (two) times daily. FERRUM HOMACCORD Liqd   Take 125 mg by mouth daily. Taking daily Nova Ferrum 125           Fish Oil Oil   by Does not apply route.                    Follow-up Instructions     Return if s [email or web address].tn

## (undated) NOTE — Clinical Note
2017    Patient: Pratik Vo  : 1930 Visit date: 3/2/2017    Dear  Dr. Art Farmer MD,    Thank you for referring Pratik Vo to my practice. Please find my assessment and plan below.         Assessment   Malignant neoplasm of ascending

## (undated) NOTE — MR AVS SNAPSHOT
[facility]  [address] [address]  [phone]               Thank you for choosing us for your health care visit with Halima Ruth MD.  We are glad to serve you and happy to provide you with this summary o later than the day before your exam as you will be drinking contrast at home prior to your appointment. Contrast must be dispensed by a CT Technologist or nurse.  Generally you will not encounter a wait, however please be aware you may experience a wait of [address] [facility] [place] [address]   [phone]            Apr 06, 2017 11:00 AM   Exam - Established Patient [facility] [place]ga [facility]ter ([facility])    [address] substance the body needs to make RBCs  · Wasting (nutritional problems and weight loss) from cancer that lower the body’s ability to make RBCs  Testing for anemia  A blood sample is taken from your arm and then tested.  Several different types of tests may Checking your progress  During the course of your treatment, you’ll likely have more blood tests. These are to check your blood levels and your response to the treatment.         Nutrition and MyPlate: Protein Foods    This group includes foods that are hig © 8984-3945 [address], [address]. All rights reserved. This information is not intended as a substitute for professional medical care. Always follow your healthcare professional's instructions.              Al Call [phone] for help. MyChart is NOT to be used for urgent needs. For medical emergencies, dial 911. Educational Information     Your blood pressure indicates you may be at-risk for Hypertension.    Please consider the following Lifestyle M ? Keep floors clear of clutter. ? Make sure carpets and area rugs have skid proof backing. ? Do not use slippery wax on bare floors. ? Keep furniture in its accustomed place. ? If you have pets, be careful that you don’t trip over them.     OUTSIDE SAF

## (undated) NOTE — IP AVS SNAPSHOT
[facility] [place] [address] [place], [address] ~ [phone]                Discharge Summary   2/14/2017    [place]           Admission Information        Provider Department    2/14/2017 [facility] [place] 3nw-A         T Continue your home routine                        Where to Get Your Medications      Please  your prescriptions at the location directed by your doctor or nurse     Bring a paper prescription for each of these medications    - HYDROcodone-acetaminop prior to showering. Soap can get on the wounds but do not scrub over the wounds. No hair dye or chemicals of any kind should get in the wounds. Avoid tub baths for two weeks and while a drain is present.   The only reason to cover a wound after removing Please call our office today for an appointment within five to ten days of discharge. Any fever greater than 100.5, chills, nausea, vomiting, or severe diarrhea please call our office.   If the wound turns red, hot, swollen, becomes increasingly painful, o INFLUENZA 10/1/2016, 9/22/2015, 9/23/2013, 9/13/2012    Pneumococcal (Prevnar 13) 6/22/2016    Pneumovax 23 (Lot Mgr) 10/8/2008    TD 9/5/2002    TDAP 6/22/2016      Recent Hematology Lab Results  (Last 3 results in the past 90 days)    WBC RBC Hemoglobin Radiology Exams     None         Additional Information       We are concerned for your overall well being:    - If you are a smoker or have smoked in the last 12 months, we encourage you to explore options for quitting.     - If you have concerns related experience these side effects or respond to medications the same. Please call your provider or healthcare team if you have any questions regarding your medications while at home.          Narcotic Medications     HYDROcodone-acetaminophen (NORCO) 5-325 MG O

## (undated) NOTE — MR AVS SNAPSHOT
After Visit Summary   3/14/2017    Camilo Epstein    MRN: [id number]           Diagnoses this Visit     Malignant ne[facility] of ascending colon ([facility]    -  Primary       Allergies     No Known Allergies      Your Vital Signs Were     BP Pulse Temp(Sr [address] [facility] [address]       Thursday April 06, 2017 11:00 AM     App[address] Wayna Curling [address] ([phone])   [facility] [place] [address] [address]       Monday

## (undated) NOTE — MR AVS SNAPSHOT
[facility]  [address] [facility] [address] [address]  [phone]               Thank you for choosing us for your health care visit with Halima Ruth MD.  We are glad to serve you and happy to provide you with this summary o even heavy menstrual periods. · Being low in certain nutrients, such as iron, folate, or vitamin B12. This may be due to poor diet.  Also, a condition like celiac disease or Crohn's disease can cause poor absorption of these nutrients  · Certain chronic co · Blood transfusions. Replacing some of your blood can increase the number of healthy RBCs in your body. · Surgery. In some cases, your doctor can do surgery to treat the underlying cause of anemia.  If you need surgery, your doctor will explain the proced Follow these guidelines when caring for yourself at home:  · Eat foods high in iron. This will boost the amount of iron stored in your body. It is a natural way to build up the number of blood cells.  Good sources of iron include beef, liver, spinach and ot by Does not apply route.                 Where to Get Your Medications      These medications were sent to [facility] [address], [address], [address] [place] [address] , [address] [facility]     Phone:  [phone] - f Hepatology  Schedule an Appointment   [phone]    Functional Status questions complete?:  Yes    Assoc Dx:  Microcytic hypochromic anemia [D50.9]          Racha[place]     Call the [employer] for assistance with your inactive [employer] account    If you have qu Dietary sodium reduction Reduce dietary sodium intake to <= 100 mmol per day (2.4 g sodium or 6 g sodium chloride)   Aerobic physical activity Regular aerobic physical activity (e.g., brisk walking, light jogging, cycling, swimming, etc.) for a goal of at

## (undated) NOTE — MR AVS SNAPSHOT
After Visit Summary   5/2/2017    Radha Martins    MRN: [id number]           Diagnoses this Visit     Counseling regarding end of life decision making    -  Primary       Allergies     No Known Allergies      Your Vital Signs Were     BP Pulse Tem

## (undated) NOTE — MR AVS SNAPSHOT
After Visit Summary   4/4/2017    Radha Martins    MRN: [id number]           Allergies     No Known Allergies      Your Vital Signs Were     Smoking Status                   Former Smoker                     Patient Instructions     None      Plea Call [phone] for help. [employer] is NOT to be used for urgent needs. For medical emergencies, dial 911.

## (undated) NOTE — LETTER
[facility]  Radha [address] [facility], [address] [place] [address]    Consent for Operation    Date: __________________    Time: _______________    1.  I authorize the performance upon Bassam Jennings the following operation:    Procedure(s):  EXPLORATORY LAPAROTO procedure has been videotaped, the surgeon will obtain the original videotape. The hospital will not be responsible for storage or maintenance of this tape.     6. For the purpose of advancing medical education, I consent to the admittance of observers to t STATEMENTS REQUIRING INSERTION OR COMPLETION WERE FILLED IN.     Signature of Patient:   ___________________________    When the patient is a minor or mentally incompetent to give consent:  Signature of person authorized to consent for patient: ____________ drugs/illegal medications). Failure to inform my anesthesiologist about these medicines may increase my risk of anesthetic complications. · If I am allergic to anything or have had a reaction to anesthesia before.     3. I understand how the anesthesia med I have discussed the procedure and information above with the patient (or patient’s representative) and answered their questions. The patient or their representative has agreed to have anesthesia services.     _______________________________________________

## (undated) NOTE — IP AVS SNAPSHOT
[facility] [place] [address] [place], [address] ~ [phone]                Discharge Summary   3/4/2017    Diego Ozuna           Admission Information        Provider Department    3/4/2017 Sujatha Cortez MD  [id number]         Than Stop taking on:  4/7/2017    Sedrick Carmona taking these medications        Instructions Authorizing Provider    Morning Afternoon Evening As Needed    CALCIUM OR        Take 1 tablet by mouth 2 (two) times riri Follow-Up Visit with Ai Perez MD   [facility] in [place] [facility])    [address] [facility] [place] [place] [facility]   [phone]            Mar 15, 2017  1:40 PM   Hospital/SNF F/U with Peter Gallardo 0.7 -- (03/05/17)  3.88 (03/05/17)  0.86 (L) (03/05/17)  0.44 (03/05/17)  0.17 (03/05/17)  0.04    (03/04/17)  72.6 (03/04/17)  15.4 (03/04/17)  8.8 (03/04/17)  2.1 (03/04/17)  0.8  (03/04/17)  4.39 (03/04/17)  0.93 (03/04/17)  0.53 (03/04/17)  0.13 (03/04 and ask to get set up for an insurance coverage that is in-network with Andrew Sams.         MyChart     Visit [email or web address]  You can access your [employer] to more actively manage your health care and view more details from this visit by going to https:/ diphenoxylate-atropine 2.5-0.025 MG Oral Tab       Use:  Nausea/vomiting, acid reflux, low bowel motility, stomach pain   Most common side effects:  Depends on the specific medication but generally include: diarrhea, constipation, headache, allergic react

## (undated) NOTE — MR AVS SNAPSHOT
After Visit Summary   4/4/2017    Cassie Radford    MRN: [id number]           Diagnoses this Visit     Malignant neoplasm of sigmoid colon ([facility]    -  Primary     Regional lymph node metastasis present ([facility]           Allergies     No Known Allergi OUTPT NUTRITION at [facility] in [place] ([phone])   Your appointment is on the [facility] in the [facility]. The address is [address] [place] [address], [facility], [address], [place].  Please register at the [facility] Fr

## (undated) NOTE — MR AVS SNAPSHOT
[facility]  [address] [facility] [address]  [phone]               Thank you for choosing us for your health care visit with Magdalena Hernández MD.  We are glad to serve you and happy to provide you with this summary o TCM (Transition Of Care Management)           Medical Issues Discussed Today     Colostomy status [facility])    Counseling regarding end of life decision making        Weight loss        Fatigue, unspecified type          Instructions and Information about You health care provider and nurse trained in hospice care. A registered nurse , also known as a RN Case Manager, coordinates care provided by the hospice team. He or she also provides skilled nursing care as needed.  The RN Case Manager makes weekl hospice, they may return to the program at any time if they qualify, such as being determined by a health care provider that these patients only have 6 months or less to live.  .  · Patients do not need to be actively dying to receive hospice care. Patients © 2109-4083 [address], [address] [place]. All rights reserved. This information is not intended as a substitute for professional medical care. Always follow your healthcare professional's instructions.              Al office, you can view your past visit information in [employer] and [employer] by going to Visits < Visit Summaries. [employer] questions? Call [phone] for help. [employer] is NOT to be used for urgent needs. For medical emergencies, dial 911.         Educational Inform

## (undated) NOTE — MR AVS SNAPSHOT
After Visit Summary   5/2/2017    Marina Curtis    MRN: [id number]           Allergies     No Known Allergies      Your Vital Signs Were     Smoking Status                   Former Smoker                     Patient Instructions     None      [place]lea

## (undated) NOTE — MR AVS SNAPSHOT
[facility]  [address] [facility] [address]  [phone]               Thank you for choosing us for your health care visit with Jacque Gamboa MD.  We are glad to serve you and happy to provide you with this summary o RBCs to absorb and release oxygen. Without enough RBCs or hemoglobin, your body doesn't get enough oxygen. Symptoms of anemia may then occur. Symptoms of anemia  Some people with anemia have no symptoms.  But most people have symptoms that range from mil microscope. Your doctor uses a stain to make the blood cells easier to see. · Iron studies. These tests measure the amount of iron in your blood. Your body needs iron to make hemoglobin in your RBCs. · Vitamin B12 and folate studies.  These tests check fo Today's Vital Signs     BP Pulse Temp Weight          120/68 mmHg 86 97.7 °F (36.5 °C) (Temporal) 80 lb           Current Medications          This list is accurate as of: 3/28/17 12:51 PM.  Always use your most recent med list.                acetaminophe return for a follow-up Blood Pressure Check in 1 year.      Lifestyle Modification Recommendations:    Modification Recommendation   Weight Reduction Maintain normal body weight (body mass index 18.5-24.9 kg/m2)   DASH eating plan Adopt a diet rich in fruit

## (undated) NOTE — Clinical Note
2017    Patient: Emy Mathew  : 1930 Visit date: 2017    Dear  Dr. Gerardo Doyle MD,    Thank you for referring Congjem Mathew to my practice. Please find my assessment and plan below.         Assessment   [facility]  (primary

## (undated) NOTE — Clinical Note
FYI: TCM call. See notes. Pt daughter stated pt is non compliant with meds 50% of time, doesn't want to eat/drink due to chemo. Chemo stopped at hospital stay. Pt c/o hard time swallowing so wont eat/drink/meds.  Daughter will bring d/c instruct, meds, etc.